# Patient Record
Sex: FEMALE | Race: WHITE | NOT HISPANIC OR LATINO
[De-identification: names, ages, dates, MRNs, and addresses within clinical notes are randomized per-mention and may not be internally consistent; named-entity substitution may affect disease eponyms.]

---

## 2017-01-20 ENCOUNTER — APPOINTMENT (OUTPATIENT)
Dept: MAMMOGRAPHY | Facility: IMAGING CENTER | Age: 67
End: 2017-01-20

## 2017-01-20 ENCOUNTER — OUTPATIENT (OUTPATIENT)
Dept: OUTPATIENT SERVICES | Facility: HOSPITAL | Age: 67
LOS: 1 days | End: 2017-01-20
Payer: MEDICARE

## 2017-01-20 ENCOUNTER — APPOINTMENT (OUTPATIENT)
Dept: RADIOLOGY | Facility: IMAGING CENTER | Age: 67
End: 2017-01-20

## 2017-01-20 DIAGNOSIS — M81.0 AGE-RELATED OSTEOPOROSIS WITHOUT CURRENT PATHOLOGICAL FRACTURE: ICD-10-CM

## 2017-01-20 DIAGNOSIS — Z12.31 ENCOUNTER FOR SCREENING MAMMOGRAM FOR MALIGNANT NEOPLASM OF BREAST: ICD-10-CM

## 2017-01-20 PROCEDURE — 77063 BREAST TOMOSYNTHESIS BI: CPT

## 2017-01-20 PROCEDURE — 77067 SCR MAMMO BI INCL CAD: CPT

## 2017-01-20 PROCEDURE — 77080 DXA BONE DENSITY AXIAL: CPT

## 2018-01-22 ENCOUNTER — APPOINTMENT (OUTPATIENT)
Dept: MAMMOGRAPHY | Facility: IMAGING CENTER | Age: 68
End: 2018-01-22
Payer: MEDICARE

## 2018-01-22 ENCOUNTER — OUTPATIENT (OUTPATIENT)
Dept: OUTPATIENT SERVICES | Facility: HOSPITAL | Age: 68
LOS: 1 days | End: 2018-01-22
Payer: MEDICARE

## 2018-01-22 DIAGNOSIS — Z00.8 ENCOUNTER FOR OTHER GENERAL EXAMINATION: ICD-10-CM

## 2018-01-22 PROCEDURE — 77067 SCR MAMMO BI INCL CAD: CPT

## 2018-01-22 PROCEDURE — 77063 BREAST TOMOSYNTHESIS BI: CPT

## 2018-01-22 PROCEDURE — 77067 SCR MAMMO BI INCL CAD: CPT | Mod: 26

## 2018-01-22 PROCEDURE — 77063 BREAST TOMOSYNTHESIS BI: CPT | Mod: 26

## 2018-08-04 ENCOUNTER — EMERGENCY (EMERGENCY)
Facility: HOSPITAL | Age: 68
LOS: 1 days | Discharge: HOME | End: 2018-08-04
Attending: EMERGENCY MEDICINE
Payer: MEDICARE

## 2018-08-04 VITALS
WEIGHT: 136.91 LBS | HEIGHT: 65 IN | RESPIRATION RATE: 15 BRPM | HEART RATE: 72 BPM | OXYGEN SATURATION: 99 % | SYSTOLIC BLOOD PRESSURE: 176 MMHG | DIASTOLIC BLOOD PRESSURE: 84 MMHG | TEMPERATURE: 98 F

## 2018-08-04 DIAGNOSIS — Z96.642 PRESENCE OF LEFT ARTIFICIAL HIP JOINT: Chronic | ICD-10-CM

## 2018-08-04 LAB
ALBUMIN SERPL ELPH-MCNC: 4.4 G/DL — SIGNIFICANT CHANGE UP (ref 3.3–5)
ALP SERPL-CCNC: 61 U/L — SIGNIFICANT CHANGE UP (ref 40–120)
ALT FLD-CCNC: 25 U/L — SIGNIFICANT CHANGE UP (ref 12–78)
ANION GAP SERPL CALC-SCNC: 10 MMOL/L — SIGNIFICANT CHANGE UP (ref 5–17)
APTT BLD: 29 SEC — SIGNIFICANT CHANGE UP (ref 27.5–37.4)
AST SERPL-CCNC: 26 U/L — SIGNIFICANT CHANGE UP (ref 15–37)
BASOPHILS # BLD AUTO: 0.01 K/UL — SIGNIFICANT CHANGE UP (ref 0–0.2)
BASOPHILS NFR BLD AUTO: 0.1 % — SIGNIFICANT CHANGE UP (ref 0–2)
BILIRUB SERPL-MCNC: 0.8 MG/DL — SIGNIFICANT CHANGE UP (ref 0.2–1.2)
BUN SERPL-MCNC: 20 MG/DL — SIGNIFICANT CHANGE UP (ref 7–23)
CALCIUM SERPL-MCNC: 9.2 MG/DL — SIGNIFICANT CHANGE UP (ref 8.5–10.1)
CHLORIDE SERPL-SCNC: 92 MMOL/L — LOW (ref 96–108)
CK MB BLD-MCNC: 1.9 % — SIGNIFICANT CHANGE UP (ref 0–3.5)
CK MB CFR SERPL CALC: 2.4 NG/ML — SIGNIFICANT CHANGE UP (ref 0–3.6)
CK SERPL-CCNC: 129 U/L — SIGNIFICANT CHANGE UP (ref 26–192)
CO2 SERPL-SCNC: 29 MMOL/L — SIGNIFICANT CHANGE UP (ref 22–31)
CREAT SERPL-MCNC: 0.91 MG/DL — SIGNIFICANT CHANGE UP (ref 0.5–1.3)
EOSINOPHIL # BLD AUTO: 0.05 K/UL — SIGNIFICANT CHANGE UP (ref 0–0.5)
EOSINOPHIL NFR BLD AUTO: 0.7 % — SIGNIFICANT CHANGE UP (ref 0–6)
GLUCOSE SERPL-MCNC: 91 MG/DL — SIGNIFICANT CHANGE UP (ref 70–99)
HCT VFR BLD CALC: 36.7 % — SIGNIFICANT CHANGE UP (ref 34.5–45)
HGB BLD-MCNC: 13 G/DL — SIGNIFICANT CHANGE UP (ref 11.5–15.5)
IMM GRANULOCYTES NFR BLD AUTO: 0.3 % — SIGNIFICANT CHANGE UP (ref 0–1.5)
INR BLD: 1.15 RATIO — SIGNIFICANT CHANGE UP (ref 0.88–1.16)
LYMPHOCYTES # BLD AUTO: 2.14 K/UL — SIGNIFICANT CHANGE UP (ref 1–3.3)
LYMPHOCYTES # BLD AUTO: 31.8 % — SIGNIFICANT CHANGE UP (ref 13–44)
MCHC RBC-ENTMCNC: 32.5 PG — SIGNIFICANT CHANGE UP (ref 27–34)
MCHC RBC-ENTMCNC: 35.4 GM/DL — SIGNIFICANT CHANGE UP (ref 32–36)
MCV RBC AUTO: 91.8 FL — SIGNIFICANT CHANGE UP (ref 80–100)
MONOCYTES # BLD AUTO: 0.72 K/UL — SIGNIFICANT CHANGE UP (ref 0–0.9)
MONOCYTES NFR BLD AUTO: 10.7 % — SIGNIFICANT CHANGE UP (ref 2–14)
NEUTROPHILS # BLD AUTO: 3.8 K/UL — SIGNIFICANT CHANGE UP (ref 1.8–7.4)
NEUTROPHILS NFR BLD AUTO: 56.4 % — SIGNIFICANT CHANGE UP (ref 43–77)
NRBC # BLD: 0 /100 WBCS — SIGNIFICANT CHANGE UP (ref 0–0)
PLATELET # BLD AUTO: 224 K/UL — SIGNIFICANT CHANGE UP (ref 150–400)
POTASSIUM SERPL-MCNC: 3.4 MMOL/L — LOW (ref 3.5–5.3)
POTASSIUM SERPL-SCNC: 3.4 MMOL/L — LOW (ref 3.5–5.3)
PROT SERPL-MCNC: 7.3 G/DL — SIGNIFICANT CHANGE UP (ref 6–8.3)
PROTHROM AB SERPL-ACNC: 12.6 SEC — SIGNIFICANT CHANGE UP (ref 9.8–12.7)
RBC # BLD: 4 M/UL — SIGNIFICANT CHANGE UP (ref 3.8–5.2)
RBC # FLD: 12.6 % — SIGNIFICANT CHANGE UP (ref 10.3–14.5)
SODIUM SERPL-SCNC: 131 MMOL/L — LOW (ref 135–145)
TROPONIN I SERPL-MCNC: <.015 NG/ML — SIGNIFICANT CHANGE UP (ref 0.01–0.04)
WBC # BLD: 6.74 K/UL — SIGNIFICANT CHANGE UP (ref 3.8–10.5)
WBC # FLD AUTO: 6.74 K/UL — SIGNIFICANT CHANGE UP (ref 3.8–10.5)

## 2018-08-04 PROCEDURE — 99285 EMERGENCY DEPT VISIT HI MDM: CPT

## 2018-08-04 PROCEDURE — 71045 X-RAY EXAM CHEST 1 VIEW: CPT | Mod: 26

## 2018-08-04 RX ORDER — LOSARTAN POTASSIUM 100 MG/1
25 TABLET, FILM COATED ORAL DAILY
Qty: 0 | Refills: 0 | Status: DISCONTINUED | OUTPATIENT
Start: 2018-08-04 | End: 2018-08-08

## 2018-08-04 RX ORDER — PANTOPRAZOLE SODIUM 20 MG/1
40 TABLET, DELAYED RELEASE ORAL ONCE
Qty: 0 | Refills: 0 | Status: COMPLETED | OUTPATIENT
Start: 2018-08-04 | End: 2018-08-04

## 2018-08-04 RX ORDER — MORPHINE SULFATE 50 MG/1
2 CAPSULE, EXTENDED RELEASE ORAL ONCE
Qty: 0 | Refills: 0 | Status: DISCONTINUED | OUTPATIENT
Start: 2018-08-04 | End: 2018-08-04

## 2018-08-04 RX ORDER — SODIUM CHLORIDE 9 MG/ML
3 INJECTION INTRAMUSCULAR; INTRAVENOUS; SUBCUTANEOUS ONCE
Qty: 0 | Refills: 0 | Status: COMPLETED | OUTPATIENT
Start: 2018-08-04 | End: 2018-08-04

## 2018-08-04 RX ORDER — KETOROLAC TROMETHAMINE 30 MG/ML
30 SYRINGE (ML) INJECTION ONCE
Qty: 0 | Refills: 0 | Status: DISCONTINUED | OUTPATIENT
Start: 2018-08-04 | End: 2018-08-04

## 2018-08-04 RX ORDER — ACETAMINOPHEN 500 MG
650 TABLET ORAL ONCE
Qty: 0 | Refills: 0 | Status: COMPLETED | OUTPATIENT
Start: 2018-08-04 | End: 2018-08-04

## 2018-08-04 RX ADMIN — Medication 650 MILLIGRAM(S): at 23:46

## 2018-08-04 RX ADMIN — Medication 650 MILLIGRAM(S): at 23:16

## 2018-08-04 RX ADMIN — SODIUM CHLORIDE 3 MILLILITER(S): 9 INJECTION INTRAMUSCULAR; INTRAVENOUS; SUBCUTANEOUS at 21:11

## 2018-08-04 NOTE — ED ADULT NURSE NOTE - NSIMPLEMENTINTERV_GEN_ALL_ED
Implemented All Universal Safety Interventions:  [place] to call system. Call bell, personal items and telephone within reach. Instruct patient to call for assistance. Room bathroom lighting operational. Non-slip footwear when patient is off stretcher. Physically safe environment: no spills, clutter or unnecessary equipment. Stretcher in lowest position, wheels locked, appropriate side rails in place.

## 2018-08-04 NOTE — ED ADULT NURSE REASSESSMENT NOTE - NS ED NURSE REASSESS COMMENT FT1
[id number] A&OX4, no shortness of breath, no diaphoresis, c/o slight chest discomfort, Tylenol 650 mg po administered as prescribed. Vital signs taken, Pt reports that /86 is on higher jesus because she runs 140 systolic, Dr. Park made aware. Pt connected to cardiopulmonary monitor, monitored.  Safety precautions observed.

## 2018-08-04 NOTE — ED PROVIDER NOTE - OBJECTIVE STATEMENT
pt c/o 2 hrs chest tightness radiating to neck, now resolved. no fevers chills, ha, d/n/v, cough, sob, abd pain, edema, calf pain, travel.  pmd - [facility] lenny

## 2018-08-04 NOTE — ED ADULT NURSE NOTE - CHIEF COMPLAINT QUOTE
midsternal chest tightness without radiation on and off x a few hours. history of reflex, taking medication without relief

## 2018-08-05 VITALS
SYSTOLIC BLOOD PRESSURE: 167 MMHG | DIASTOLIC BLOOD PRESSURE: 83 MMHG | OXYGEN SATURATION: 100 % | RESPIRATION RATE: 16 BRPM | HEART RATE: 62 BPM

## 2018-08-05 LAB
CK MB BLD-MCNC: 1.5 % — SIGNIFICANT CHANGE UP (ref 0–3.5)
CK MB BLD-MCNC: 1.8 % — SIGNIFICANT CHANGE UP (ref 0–3.5)
CK MB CFR SERPL CALC: 1.7 NG/ML — SIGNIFICANT CHANGE UP (ref 0–3.6)
CK MB CFR SERPL CALC: 1.9 NG/ML — SIGNIFICANT CHANGE UP (ref 0–3.6)
CK SERPL-CCNC: 107 U/L — SIGNIFICANT CHANGE UP (ref 26–192)
CK SERPL-CCNC: 113 U/L — SIGNIFICANT CHANGE UP (ref 26–192)
TROPONIN I SERPL-MCNC: <.015 NG/ML — SIGNIFICANT CHANGE UP (ref 0.01–0.04)
TROPONIN I SERPL-MCNC: <.015 NG/ML — SIGNIFICANT CHANGE UP (ref 0.01–0.04)

## 2018-08-05 PROCEDURE — 36415 COLL VENOUS BLD VENIPUNCTURE: CPT

## 2018-08-05 PROCEDURE — 85730 THROMBOPLASTIN TIME PARTIAL: CPT

## 2018-08-05 PROCEDURE — 96375 TX/PRO/DX INJ NEW DRUG ADDON: CPT

## 2018-08-05 PROCEDURE — 85610 PROTHROMBIN TIME: CPT

## 2018-08-05 PROCEDURE — 84484 ASSAY OF TROPONIN QUANT: CPT

## 2018-08-05 PROCEDURE — 82553 CREATINE MB FRACTION: CPT

## 2018-08-05 PROCEDURE — 96374 THER/PROPH/DIAG INJ IV PUSH: CPT

## 2018-08-05 PROCEDURE — 99284 EMERGENCY DEPT VISIT MOD MDM: CPT | Mod: 25

## 2018-08-05 PROCEDURE — 80053 COMPREHEN METABOLIC PANEL: CPT

## 2018-08-05 PROCEDURE — 99285 EMERGENCY DEPT VISIT HI MDM: CPT

## 2018-08-05 PROCEDURE — 93005 ELECTROCARDIOGRAM TRACING: CPT

## 2018-08-05 PROCEDURE — 71045 X-RAY EXAM CHEST 1 VIEW: CPT

## 2018-08-05 PROCEDURE — 82550 ASSAY OF CK (CPK): CPT

## 2018-08-05 PROCEDURE — 85027 COMPLETE CBC AUTOMATED: CPT

## 2018-08-05 RX ORDER — LOSARTAN POTASSIUM 100 MG/1
100 TABLET, FILM COATED ORAL ONCE
Qty: 0 | Refills: 0 | Status: COMPLETED | OUTPATIENT
Start: 2018-08-05 | End: 2018-08-05

## 2018-08-05 RX ORDER — ONDANSETRON 8 MG/1
4 TABLET, FILM COATED ORAL ONCE
Qty: 0 | Refills: 0 | Status: COMPLETED | OUTPATIENT
Start: 2018-08-05 | End: 2018-08-05

## 2018-08-05 RX ORDER — MORPHINE SULFATE 50 MG/1
2 CAPSULE, EXTENDED RELEASE ORAL ONCE
Qty: 0 | Refills: 0 | Status: DISCONTINUED | OUTPATIENT
Start: 2018-08-05 | End: 2018-08-05

## 2018-08-05 RX ADMIN — LOSARTAN POTASSIUM 25 MILLIGRAM(S): 100 TABLET, FILM COATED ORAL at 00:27

## 2018-08-05 RX ADMIN — Medication 30 MILLIGRAM(S): at 00:54

## 2018-08-05 RX ADMIN — ONDANSETRON 4 MILLIGRAM(S): 8 TABLET, FILM COATED ORAL at 04:35

## 2018-08-05 RX ADMIN — MORPHINE SULFATE 2 MILLIGRAM(S): 50 CAPSULE, EXTENDED RELEASE ORAL at 04:38

## 2018-08-05 RX ADMIN — MORPHINE SULFATE 2 MILLIGRAM(S): 50 CAPSULE, EXTENDED RELEASE ORAL at 05:08

## 2018-08-05 RX ADMIN — Medication 30 MILLIGRAM(S): at 00:24

## 2018-08-05 RX ADMIN — LOSARTAN POTASSIUM 100 MILLIGRAM(S): 100 TABLET, FILM COATED ORAL at 08:47

## 2018-08-05 RX ADMIN — PANTOPRAZOLE SODIUM 40 MILLIGRAM(S): 20 TABLET, DELAYED RELEASE ORAL at 00:28

## 2018-08-05 NOTE — ED ADULT NURSE REASSESSMENT NOTE - NS ED NURSE REASSESS COMMENT FT1
[id number] Pt A&OX4 c/o chest pain numerical value 7/10 described pain "like a ball & tightness" non radiating, Dr. Park notified.  Stat EKG done & seen by Vivian.  No shortness of breath or diaphoresis. Medicated with Zofran 4mg ivp & Morphine Sulphate 2mg ivp as prescribed. O2 2 liters via nasal cannula started & in progress. Monitored closely.

## 2018-08-05 NOTE — CONSULT NOTE ADULT - SUBJECTIVE AND OBJECTIVE BOX
[facility]         Olya Coyne, Carmen, Theresa, Evelina, Chris, Tessa        [phone] (office)    CHIEF COMPLAINT: Patient is a 68y old  Female who presents with a chief complaint of cp    HPI: 68f htn, gerd, no known structural heart disease.  Very active at baseline, and rows 10k meters in 64min.  Able to be this active without any reproducible cv sxs suggestive of angina, hf or arrhythmia.  Was on ppi for gerd in the past, dc for relationship to osteoporosis.    Recently started checking her bp and found it elevated, and so started on hctz by her pcp.  Has been taking it only 2 days.  Yesterday at ~6p began to experience mid sternal cp rad into neck.  For 2 h was essentially constant, and after that it would return but wax and wane.  ekgs and ce x 3 all negative.  consultation is now being obtained.      PAST MEDICAL & SURGICAL HISTORY:  Hypertension  GERD (gastroesophageal reflux disease)  Arthritis  History of left hip replacement: 2013      SOCIAL HISTORY: No active tobacco. former smoker, 1-2 wine daily    FAMILY HISTORY:  +fh of premature athero, mother with mi in 50s    Outpatient medications:  mylanta  zantac  hctz 25  toprol 50  los 100    MEDICATIONS  (STANDING):  losartan 25 milliGRAM(s) Oral daily    MEDICATIONS  (PRN):      Allergies    Arthrotec (Other)  NSAIDs (Other)  sulfa drugs (Unknown)  tetracycline (Unknown)    Intolerances        REVIEW OF SYSTEMS: Is negative for eye, ENT, GI, , allergic, dermatologic, musculoskeletal and neurologic, except as described above.    VITAL SIGNS:   Vital Signs Last 24 Hrs  T(C): 36.5 (05 Aug 2018 07:54), Max: 36.7 (05 Aug 2018 01:15)  T(F): 97.7 (05 Aug 2018 07:54), Max: 98 (05 Aug 2018 01:15)  HR: 62 (05 Aug 2018 08:47) (60 - 72)  BP: 167/83 (05 Aug 2018 08:47) (140/72 - 179/84)  BP(mean): --  RR: 16 (05 Aug 2018 08:47) (15 - 21)  SpO2: 100% (05 Aug 2018 08:47) (96% - 100%)    I&O's Summary      PHYSICAL EXAM:    Constitutional: NAD, awake and alert, well-developed  Eyes:  EOMI, no oral cyanosis, conjunctivae clear, anicteric.  Pulmonary: Non-labored, breath sounds are clear bilaterally, no wheezing, rales or rhonchi  Cardiovascular:  regular S1 and S2. No murmur.  No rubs, gallops or clicks  Gastrointestinal: Bowel Sounds present, soft, nontender.   Lymph: No peripheral edema.   Neurological: Alert, strength and sensitivity are grossly intact  Skin: No obvious lesions/rashes.   Psych:  Mood & affect appropriate .    LABS: All Labs Reviewed:                        13.0   6.74  )-----------( 224      ( 04 Aug 2018 20:45 )             36.7     04 Aug 2018 20:45    131    |  92     |  20     ----------------------------<  91     3.4     |  29     |  0.91     Ca    9.2        04 Aug 2018 20:45    TPro  7.3    /  Alb  4.4    /  TBili  0.8    /  DBili  x      /  AST  26     /  ALT  25     /  AlkPhos  61     04 Aug 2018 20:45    PT/INR - ( 04 Aug 2018 20:45 )   PT: 12.6 sec;   INR: 1.15 ratio         PTT - ( 04 Aug 2018 20:45 )  PTT:29.0 sec  CARDIAC MARKERS ( 05 Aug 2018 08:06 )  <.015 ng/mL / x     / 113 U/L / x     / 1.7 ng/mL  CARDIAC MARKERS ( 05 Aug 2018 02:23 )  <.015 ng/mL / x     / 107 U/L / x     / 1.9 ng/mL  CARDIAC MARKERS ( 04 Aug 2018 20:45 )  <.015 ng/mL / x     / 129 U/L / x     / 2.4 ng/mL      Blood Culture:         RADIOLOGY:    EKG: nsr

## 2018-08-05 NOTE — ED ADULT NURSE REASSESSMENT NOTE - NS ED NURSE REASSESS COMMENT FT1
[id number] Pt medicated with Toradol 30mg ivp numerical pain scale 7/10 to chest wall., Losaartan 25mg po &Protonix 40mg iv as prescribed. monitored.

## 2018-08-05 NOTE — CONSULT NOTE ADULT - ASSESSMENT
The patient's chest discomfort is of uncertain etiology. Overall, at this point, it seems relatively unlikely that the patient is suffering chest discomfort on the basis of an acute coronary syndrome.    Based on the patient's history, physical examination, EKG and laboratory testing, it seems as if the patient is at not a high risk individual. Based on the patient's clinical course, I would recommend   that they be discharged with the expectation that they would followup in our office for outpatient testing.  I have recommended outpatient echocardiography to make an assessment of cardiac structural integrity, as well as nuclear stress testing. The patient has been provided written instructions to obtain these in our office. The patient should continue aspirin, 81 mg daily, at least until the above-mentioned examinations have been completed.    If those examinations are unremarkable, alternative explanations for chest discomfort, including a gastrointestinal etiology and a musculoskeletal etiology, will need to be considered.  I favor a GI etiology and have suggested that she resume ppi, at least for the short term.  A followup evaluation for uncontrolled htn has been suggested in ~ 1month

## 2018-08-05 NOTE — ED ADULT NURSE REASSESSMENT NOTE - NS ED NURSE REASSESS COMMENT FT1
0615 A&OX4 denies pain at this time. stated first time felt so much better. Vital signs stable. Pending cardiology consult. (2nd enzyme neg)

## 2018-08-06 ENCOUNTER — OTHER (OUTPATIENT)
Age: 68
End: 2018-08-06

## 2018-08-06 DIAGNOSIS — R07.89 OTHER CHEST PAIN: ICD-10-CM

## 2018-08-06 PROBLEM — K21.9 GASTRO-ESOPHAGEAL REFLUX DISEASE WITHOUT ESOPHAGITIS: Chronic | Status: ACTIVE | Noted: 2018-08-04

## 2018-08-06 PROBLEM — I10 ESSENTIAL (PRIMARY) HYPERTENSION: Chronic | Status: ACTIVE | Noted: 2018-08-04

## 2018-08-06 PROBLEM — M19.90 UNSPECIFIED OSTEOARTHRITIS, UNSPECIFIED SITE: Chronic | Status: ACTIVE | Noted: 2018-08-04

## 2018-08-08 ENCOUNTER — APPOINTMENT (OUTPATIENT)
Dept: CARDIOLOGY | Facility: CLINIC | Age: 68
End: 2018-08-08
Payer: MEDICARE

## 2018-08-08 PROCEDURE — 78452 HT MUSCLE IMAGE SPECT MULT: CPT

## 2018-08-08 PROCEDURE — A9500: CPT

## 2018-08-08 PROCEDURE — 93015 CV STRESS TEST SUPVJ I&R: CPT

## 2018-08-13 ENCOUNTER — APPOINTMENT (OUTPATIENT)
Dept: CARDIOLOGY | Facility: CLINIC | Age: 68
End: 2018-08-13
Payer: MEDICARE

## 2018-08-13 PROCEDURE — 93306 TTE W/DOPPLER COMPLETE: CPT

## 2019-03-05 ENCOUNTER — APPOINTMENT (OUTPATIENT)
Dept: MAMMOGRAPHY | Facility: IMAGING CENTER | Age: 69
End: 2019-03-05
Payer: MEDICARE

## 2019-03-05 ENCOUNTER — OUTPATIENT (OUTPATIENT)
Dept: OUTPATIENT SERVICES | Facility: HOSPITAL | Age: 69
LOS: 1 days | End: 2019-03-05
Payer: MEDICARE

## 2019-03-05 DIAGNOSIS — Z96.642 PRESENCE OF LEFT ARTIFICIAL HIP JOINT: Chronic | ICD-10-CM

## 2019-03-05 DIAGNOSIS — Z00.8 ENCOUNTER FOR OTHER GENERAL EXAMINATION: ICD-10-CM

## 2019-03-05 PROCEDURE — 77067 SCR MAMMO BI INCL CAD: CPT | Mod: 26

## 2019-03-05 PROCEDURE — 77063 BREAST TOMOSYNTHESIS BI: CPT

## 2019-03-05 PROCEDURE — 77063 BREAST TOMOSYNTHESIS BI: CPT | Mod: 26

## 2019-03-05 PROCEDURE — 77067 SCR MAMMO BI INCL CAD: CPT

## 2020-03-06 ENCOUNTER — OUTPATIENT (OUTPATIENT)
Dept: OUTPATIENT SERVICES | Facility: HOSPITAL | Age: 70
LOS: 1 days | End: 2020-03-06
Payer: MEDICARE

## 2020-03-06 ENCOUNTER — APPOINTMENT (OUTPATIENT)
Dept: MAMMOGRAPHY | Facility: IMAGING CENTER | Age: 70
End: 2020-03-06
Payer: MEDICARE

## 2020-03-06 DIAGNOSIS — Z00.8 ENCOUNTER FOR OTHER GENERAL EXAMINATION: ICD-10-CM

## 2020-03-06 DIAGNOSIS — Z96.642 PRESENCE OF LEFT ARTIFICIAL HIP JOINT: Chronic | ICD-10-CM

## 2020-03-06 PROCEDURE — 77067 SCR MAMMO BI INCL CAD: CPT

## 2020-03-06 PROCEDURE — 77067 SCR MAMMO BI INCL CAD: CPT | Mod: 26

## 2020-03-06 PROCEDURE — 77063 BREAST TOMOSYNTHESIS BI: CPT

## 2020-03-06 PROCEDURE — 77063 BREAST TOMOSYNTHESIS BI: CPT | Mod: 26

## 2020-08-04 ENCOUNTER — OUTPATIENT (OUTPATIENT)
Dept: OUTPATIENT SERVICES | Facility: HOSPITAL | Age: 70
LOS: 1 days | End: 2020-08-04
Payer: MEDICARE

## 2020-08-04 ENCOUNTER — APPOINTMENT (OUTPATIENT)
Dept: RADIOLOGY | Facility: IMAGING CENTER | Age: 70
End: 2020-08-04
Payer: MEDICARE

## 2020-08-04 DIAGNOSIS — Z00.8 ENCOUNTER FOR OTHER GENERAL EXAMINATION: ICD-10-CM

## 2020-08-04 DIAGNOSIS — Z96.642 PRESENCE OF LEFT ARTIFICIAL HIP JOINT: Chronic | ICD-10-CM

## 2020-08-04 PROCEDURE — 77080 DXA BONE DENSITY AXIAL: CPT | Mod: 26

## 2020-08-04 PROCEDURE — 77080 DXA BONE DENSITY AXIAL: CPT

## 2021-03-09 ENCOUNTER — OUTPATIENT (OUTPATIENT)
Dept: OUTPATIENT SERVICES | Facility: HOSPITAL | Age: 71
LOS: 1 days | End: 2021-03-09
Payer: MEDICARE

## 2021-03-09 ENCOUNTER — APPOINTMENT (OUTPATIENT)
Dept: MAMMOGRAPHY | Facility: IMAGING CENTER | Age: 71
End: 2021-03-09
Payer: MEDICARE

## 2021-03-09 DIAGNOSIS — Z00.8 ENCOUNTER FOR OTHER GENERAL EXAMINATION: ICD-10-CM

## 2021-03-09 DIAGNOSIS — Z96.642 PRESENCE OF LEFT ARTIFICIAL HIP JOINT: Chronic | ICD-10-CM

## 2021-03-09 PROCEDURE — 77063 BREAST TOMOSYNTHESIS BI: CPT | Mod: 26

## 2021-03-09 PROCEDURE — 77063 BREAST TOMOSYNTHESIS BI: CPT

## 2021-03-09 PROCEDURE — 77067 SCR MAMMO BI INCL CAD: CPT

## 2021-03-09 PROCEDURE — 77067 SCR MAMMO BI INCL CAD: CPT | Mod: 26

## 2021-12-18 ENCOUNTER — EMERGENCY (EMERGENCY)
Facility: HOSPITAL | Age: 71
LOS: 1 days | Discharge: HOME | End: 2021-12-18
Attending: STUDENT IN AN ORGANIZED HEALTH CARE EDUCATION/TRAINING PROGRAM | Admitting: EMERGENCY MEDICINE
Payer: MEDICARE

## 2021-12-18 VITALS
HEIGHT: 65 IN | RESPIRATION RATE: 18 BRPM | TEMPERATURE: 98 F | DIASTOLIC BLOOD PRESSURE: 96 MMHG | SYSTOLIC BLOOD PRESSURE: 173 MMHG | OXYGEN SATURATION: 98 % | HEART RATE: 115 BPM | WEIGHT: 139.99 LBS

## 2021-12-18 VITALS
SYSTOLIC BLOOD PRESSURE: 157 MMHG | RESPIRATION RATE: 18 BRPM | DIASTOLIC BLOOD PRESSURE: 72 MMHG | TEMPERATURE: 98 F | HEART RATE: 92 BPM | OXYGEN SATURATION: 98 %

## 2021-12-18 DIAGNOSIS — Z96.642 PRESENCE OF LEFT ARTIFICIAL HIP JOINT: Chronic | ICD-10-CM

## 2021-12-18 LAB
ALBUMIN SERPL ELPH-MCNC: 3.5 G/DL — SIGNIFICANT CHANGE UP (ref 3.3–5)
ALP SERPL-CCNC: 78 U/L — SIGNIFICANT CHANGE UP (ref 40–120)
ALT FLD-CCNC: 25 U/L — SIGNIFICANT CHANGE UP (ref 12–78)
ANION GAP SERPL CALC-SCNC: 7 MMOL/L — SIGNIFICANT CHANGE UP (ref 5–17)
AST SERPL-CCNC: 27 U/L — SIGNIFICANT CHANGE UP (ref 15–37)
BASOPHILS # BLD AUTO: 0.02 K/UL — SIGNIFICANT CHANGE UP (ref 0–0.2)
BASOPHILS NFR BLD AUTO: 0.3 % — SIGNIFICANT CHANGE UP (ref 0–2)
BILIRUB SERPL-MCNC: 0.3 MG/DL — SIGNIFICANT CHANGE UP (ref 0.2–1.2)
BUN SERPL-MCNC: 9 MG/DL — SIGNIFICANT CHANGE UP (ref 7–23)
CALCIUM SERPL-MCNC: 8.6 MG/DL — SIGNIFICANT CHANGE UP (ref 8.5–10.1)
CHLORIDE SERPL-SCNC: 102 MMOL/L — SIGNIFICANT CHANGE UP (ref 96–108)
CK MB BLD-MCNC: 1.2 % — SIGNIFICANT CHANGE UP (ref 0–3.5)
CK MB CFR SERPL CALC: 1.6 NG/ML — SIGNIFICANT CHANGE UP (ref 0–3.6)
CK SERPL-CCNC: 137 U/L — SIGNIFICANT CHANGE UP (ref 26–192)
CO2 SERPL-SCNC: 27 MMOL/L — SIGNIFICANT CHANGE UP (ref 22–31)
CREAT SERPL-MCNC: 0.72 MG/DL — SIGNIFICANT CHANGE UP (ref 0.5–1.3)
D DIMER BLD IA.RAPID-MCNC: 261 NG/ML DDU — HIGH
EOSINOPHIL # BLD AUTO: 0.05 K/UL — SIGNIFICANT CHANGE UP (ref 0–0.5)
EOSINOPHIL NFR BLD AUTO: 0.8 % — SIGNIFICANT CHANGE UP (ref 0–6)
GLUCOSE SERPL-MCNC: 100 MG/DL — HIGH (ref 70–99)
HCT VFR BLD CALC: 35.6 % — SIGNIFICANT CHANGE UP (ref 34.5–45)
HGB BLD-MCNC: 12.5 G/DL — SIGNIFICANT CHANGE UP (ref 11.5–15.5)
IMM GRANULOCYTES NFR BLD AUTO: 0.3 % — SIGNIFICANT CHANGE UP (ref 0–1.5)
LYMPHOCYTES # BLD AUTO: 1.68 K/UL — SIGNIFICANT CHANGE UP (ref 1–3.3)
LYMPHOCYTES # BLD AUTO: 26 % — SIGNIFICANT CHANGE UP (ref 13–44)
MCHC RBC-ENTMCNC: 33.5 PG — SIGNIFICANT CHANGE UP (ref 27–34)
MCHC RBC-ENTMCNC: 35.1 GM/DL — SIGNIFICANT CHANGE UP (ref 32–36)
MCV RBC AUTO: 95.4 FL — SIGNIFICANT CHANGE UP (ref 80–100)
MONOCYTES # BLD AUTO: 0.86 K/UL — SIGNIFICANT CHANGE UP (ref 0–0.9)
MONOCYTES NFR BLD AUTO: 13.3 % — SIGNIFICANT CHANGE UP (ref 2–14)
NEUTROPHILS # BLD AUTO: 3.82 K/UL — SIGNIFICANT CHANGE UP (ref 1.8–7.4)
NEUTROPHILS NFR BLD AUTO: 59.3 % — SIGNIFICANT CHANGE UP (ref 43–77)
NRBC # BLD: 0 /100 WBCS — SIGNIFICANT CHANGE UP (ref 0–0)
PLATELET # BLD AUTO: 233 K/UL — SIGNIFICANT CHANGE UP (ref 150–400)
POTASSIUM SERPL-MCNC: 3.9 MMOL/L — SIGNIFICANT CHANGE UP (ref 3.5–5.3)
POTASSIUM SERPL-SCNC: 3.9 MMOL/L — SIGNIFICANT CHANGE UP (ref 3.5–5.3)
PROT SERPL-MCNC: 6.8 G/DL — SIGNIFICANT CHANGE UP (ref 6–8.3)
RBC # BLD: 3.73 M/UL — LOW (ref 3.8–5.2)
RBC # FLD: 13 % — SIGNIFICANT CHANGE UP (ref 10.3–14.5)
SARS-COV-2 RNA SPEC QL NAA+PROBE: SIGNIFICANT CHANGE UP
SODIUM SERPL-SCNC: 136 MMOL/L — SIGNIFICANT CHANGE UP (ref 135–145)
TROPONIN I, HIGH SENSITIVITY RESULT: 9.3 NG/L — SIGNIFICANT CHANGE UP
WBC # BLD: 6.45 K/UL — SIGNIFICANT CHANGE UP (ref 3.8–10.5)
WBC # FLD AUTO: 6.45 K/UL — SIGNIFICANT CHANGE UP (ref 3.8–10.5)

## 2021-12-18 PROCEDURE — 71045 X-RAY EXAM CHEST 1 VIEW: CPT

## 2021-12-18 PROCEDURE — 93010 ELECTROCARDIOGRAM REPORT: CPT

## 2021-12-18 PROCEDURE — 36415 COLL VENOUS BLD VENIPUNCTURE: CPT

## 2021-12-18 PROCEDURE — 99284 EMERGENCY DEPT VISIT MOD MDM: CPT | Mod: 25

## 2021-12-18 PROCEDURE — 71275 CT ANGIOGRAPHY CHEST: CPT | Mod: 26,MA

## 2021-12-18 PROCEDURE — 85379 FIBRIN DEGRADATION QUANT: CPT

## 2021-12-18 PROCEDURE — 82550 ASSAY OF CK (CPK): CPT

## 2021-12-18 PROCEDURE — 84484 ASSAY OF TROPONIN QUANT: CPT

## 2021-12-18 PROCEDURE — U0003: CPT

## 2021-12-18 PROCEDURE — 99285 EMERGENCY DEPT VISIT HI MDM: CPT

## 2021-12-18 PROCEDURE — 96360 HYDRATION IV INFUSION INIT: CPT | Mod: XU

## 2021-12-18 PROCEDURE — 82553 CREATINE MB FRACTION: CPT

## 2021-12-18 PROCEDURE — 71045 X-RAY EXAM CHEST 1 VIEW: CPT | Mod: 26

## 2021-12-18 PROCEDURE — 71275 CT ANGIOGRAPHY CHEST: CPT | Mod: MA

## 2021-12-18 PROCEDURE — 99284 EMERGENCY DEPT VISIT MOD MDM: CPT

## 2021-12-18 PROCEDURE — 85025 COMPLETE CBC W/AUTO DIFF WBC: CPT

## 2021-12-18 PROCEDURE — 80053 COMPREHEN METABOLIC PANEL: CPT

## 2021-12-18 PROCEDURE — U0005: CPT

## 2021-12-18 PROCEDURE — 93005 ELECTROCARDIOGRAM TRACING: CPT

## 2021-12-18 RX ORDER — METOPROLOL TARTRATE 50 MG
1 TABLET ORAL
Qty: 0 | Refills: 0 | DISCHARGE

## 2021-12-18 RX ORDER — ASPIRIN/CALCIUM CARB/MAGNESIUM 324 MG
1 TABLET ORAL
Qty: 0 | Refills: 0 | DISCHARGE

## 2021-12-18 RX ORDER — LOSARTAN POTASSIUM 100 MG/1
1 TABLET, FILM COATED ORAL
Qty: 0 | Refills: 0 | DISCHARGE

## 2021-12-18 RX ORDER — RANITIDINE HYDROCHLORIDE 150 MG/1
1 TABLET, FILM COATED ORAL
Qty: 0 | Refills: 0 | DISCHARGE

## 2021-12-18 RX ORDER — SODIUM CHLORIDE 9 MG/ML
1000 INJECTION INTRAMUSCULAR; INTRAVENOUS; SUBCUTANEOUS ONCE
Refills: 0 | Status: COMPLETED | OUTPATIENT
Start: 2021-12-18 | End: 2021-12-18

## 2021-12-18 RX ADMIN — SODIUM CHLORIDE 1000 MILLILITER(S): 9 INJECTION INTRAMUSCULAR; INTRAVENOUS; SUBCUTANEOUS at 07:37

## 2021-12-18 RX ADMIN — SODIUM CHLORIDE 1000 MILLILITER(S): 9 INJECTION INTRAMUSCULAR; INTRAVENOUS; SUBCUTANEOUS at 08:37

## 2021-12-18 NOTE — ED PROVIDER NOTE - PROGRESS NOTE DETAILS
pt seen by dr stephens cardiology and if cta neg pt to be discharged on increased Toprol  she usually takes 50 qd to increase dosing to 100 qd and follow up in office

## 2021-12-18 NOTE — ED ADULT NURSE REASSESSMENT NOTE - NS ED NURSE REASSESS COMMENT FT1
Received pt alert and orientated. Pt denies SOB abd pain and chest pain. Pt was sleeping last night and woke up with heart racing was 115 when got here now 100 beats per minute pt states she is feeling better. Pt is able to walk and move all extremities. Pt walked to the bathroom. Call bell within reach. Freq rounding performed. Safety/Comfort maintained. Will continue to monitor. Pt is awaiting a consult.

## 2021-12-18 NOTE — ED PROVIDER NOTE - OBJECTIVE STATEMENT
71 year old female with a history of HTN presents with tachycardia.  Patient states she woke up from sleep suddenly and felt palpitations/heart racing.  She noted her pulse to be 120-125.  Denies chest pain, SOB, diaphoresis, n/v/d.  States she never had tachycardia in the past.  Denies increase in caffein intake, stress, or bad dream.  Currently she feels "jumpy."  She does not have a cardiologist.  PMD Dr. Leon 71 year old female with a history of HTN presents with tachycardia.  Patient states she woke up from sleep suddenly and felt palpitations/heart racing.  She noted her pulse to be 120-125.  Denies chest pain, SOB, diaphoresis, n/v/d.  States she never had tachycardia in the past.  Denies increase in caffein intake, stress, or bad dream.  Currently she feels "jumpy."  She does not have a cardiologist.  No recent travel, surgery, immobilization, exogenous estrogen use.  No recent illness. PMD Dr. Leon

## 2021-12-18 NOTE — ED PROVIDER NOTE - CLINICAL SUMMARY MEDICAL DECISION MAKING FREE TEXT BOX
71 year old female with a history of HTN presents with heart racing/palpitations that woke her up from sleep 1 hour PTA.  Denies associated c/p, SOB, diaphoresis, syncope,  Currently feels better.  Labs, d-dimer, CE, EKG, CXR, consult cardiology,  R/o PE

## 2021-12-18 NOTE — CONSULT NOTE ADULT - SUBJECTIVE AND OBJECTIVE BOX
CHIEF COMPLAINT: Patient is a 71y old  Female who presents with a chief complaint of palpitations    HPI:  72 y/o F pMH HTN, osteoporosis, hip replacement, GERD who presented to ED after being awoken from sleep w/ "Pounding heart rate" w/ no other symptoms. No complaints of chest pain, dyspnea,  decreased exercise tolerance, N/V, HA reported. No signs of orthopnea or PND.  In ambulance HR was reported to be 120s.      PAST MEDICAL & SURGICAL HISTORY:  Arthritis    GERD (gastroesophageal reflux disease)    Hypertension    History of left hip replacement  2013        SOCIAL HISTORY:  No tobacco, ethanol, or drug abuse.    FAMILY HISTORY:    No family history of acute MI or sudden cardiac death.    MEDICATIONS  (STANDING):    MEDICATIONS  (PRN):      Allergies    Arthrotec (Other)  NSAIDs (Other)  sulfa drugs (Unknown)  tetracycline (Unknown)    Intolerances        REVIEW OF SYSTEMS:    CONSTITUTIONAL: No weakness, fevers or chills  EYES/ENT: No visual changes;  No vertigo or throat pain   NECK: No pain or stiffness  RESPIRATORY: No cough, wheezing, hemoptysis; No shortness of breath  CARDIOVASCULAR: No chest pain or palpitations  GASTROINTESTINAL: No abdominal pain. No nausea, vomiting, or hematemesis; No diarrhea or constipation. No melena or hematochezia.  GENITOURINARY: No dysuria, frequency or hematuria  NEUROLOGICAL: No numbness or weakness  SKIN: No itching or rash  All other review of systems is negative unless indicated above    VITAL SIGNS:   Vital Signs Last 24 Hrs  T(C): 36.8 (18 Dec 2021 07:58), Max: 36.8 (18 Dec 2021 05:26)  T(F): 98.2 (18 Dec 2021 07:58), Max: 98.2 (18 Dec 2021 05:26)  HR: 100 (18 Dec 2021 07:58) (100 - 115)  BP: 143/61 (18 Dec 2021 07:58) (143/61 - 173/96)  BP(mean): --  RR: 18 (18 Dec 2021 07:58) (18 - 18)  SpO2: 100% (18 Dec 2021 07:58) (98% - 100%)    I&O's Summary      On Exam:  Tele: ST  Constitutional: NAD, alert and oriented x 3  Lungs:  Non-labored, breath sounds are clear bilaterally, No wheezing, rales or rhonchi  Cardiovascular: RRR.  S1 and S2 positive.  No murmurs, rubs, gallops or clicks  Gastrointestinal: Bowel Sounds present, soft, nontender.   Lymph: No peripheral edema. No cervical lymphadenopathy.  Neurological: Alert, no focal deficits  Skin: No rashes or ulcers   Psych:  Mood & affect appropriate.    LABS: All Labs Reviewed:                        12.5   6.45  )-----------( 233      ( 18 Dec 2021 06:32 )             35.6     18 Dec 2021 06:32    136    |  102    |  9      ----------------------------<  100    3.9     |  27     |  0.72     Ca    8.6        18 Dec 2021 06:32    TPro  6.8    /  Alb  3.5    /  TBili  0.3    /  DBili  x      /  AST  27     /  ALT  25     /  AlkPhos  78     18 Dec 2021 06:32      CARDIAC MARKERS ( 18 Dec 2021 06:32 )  x     / x     / 137 U/L / x     / 1.6 ng/mL      Blood Culture:         RADIOLOGY:  < from: CT Angio Chest PE Protocol w/ IV Cont (12.18.21 @ 09:41) >  ACC: [id number] EXAM:  CT ANGIO CHEST PULM [facility]                          PROCEDURE DATE:  12/18/2021          INTERPRETATION:  CLINICAL INFORMATION: Palpitations, tachycardia.    COMPARISON: None.    CONTRAST/COMPLICATIONS:  IV Contrast: Omnipaque 350  59 cc administered   41 cc discarded  Oral Contrast: NONE  Complications: None reported at time of study completion    PROCEDURE:  CT Angiography of the Chest.  Sagittal and coronal reformats were performed as well as 3D (MIP)   reconstructions.    FINDINGS:    LUNGS AND AIRWAYS: Patent central airways.  Lungs are clear.  PLEURA: No pleural effusion. No pneumothorax or pneumomediastinum.  MEDIASTINUM AND WESLEY: No lymphadenopathy.  VESSELS: There is no evidence of filling defect in the first, second, or   third order branches of the pulmonary arteries. The pulmonary trunk and   main pulmonary arteries are unremarkable. The thoracic aorta and great   vessels are unremarkable.  HEART: Heart size is normal. No pericardial effusion.  CHEST WALL AND LOWER NECK: Within normal limits.  VISUALIZED UPPER ABDOMEN: Within normal limits.  BONES: Within normal limits.    IMPRESSION: No evidence of pulmonary embolism.    --- End of Report ---            ALEXANDRO CERVANTES MD; Attending Radiologist  Thisdocument has been electronically signed. Dec 18 2021 10:10AM    < end of copied text >    EKG:  ST

## 2021-12-18 NOTE — CONSULT NOTE ADULT - ASSESSMENT
70 y/o F pMH HTN, osteoporosis, hip replacement, GERD who presented to ED after being awoken from sleep w/ "Pounding heart rate" w/ no other symptoms. No complaints of chest pain, dyspnea,  decreased exercise tolerance, N/V, HA reported. No signs of orthopnea or PND.  In ambulance HR was reported to be 120s.        Pt has been asymptomatic   -there is no evidence of acute ischemia.  -her ecg is w/o ischemic changes and unchanged when compared to prior ECG   -Trop negative x1 would trend one more 4 hours from first   -there is no evidence of significant arrhythmia.  -Ddimer noted  -CT negative for PE  -would recommend increasing toprol xl to 100 daily  -there is no evidence for meaningful  volume overload.  -From a cardiac standpoint if second trop negative  the patient may be discharged home  - To follow up w/ cardiology in one week   -Pt is hemodynamically stable    Thank you for the consult  Will continue to follow  Boby Scanlon DNP  Cardiology   Spectra #[phone]

## 2021-12-18 NOTE — ED PROVIDER NOTE - CARE PROVIDER_API CALL
Boby Morales)  Cardiovascular Disease  [address] [place] [address]  Phone: [phone]  Fax: [phone]  Follow Up Time:

## 2021-12-18 NOTE — ED PROVIDER NOTE - NSFOLLOWUPINSTRUCTIONS_ED_ALL_ED_FT
INCREASE YOUR DAILY TOPROL FROM 50 MG  MG DAILY  FOLLOW UP WITH DR ARITA    Heart Palpitations    WHAT YOU NEED TO KNOW:    Heart palpitations are feelings that your heart races, jumps, throbs, or flutters. You may feel extra beats, no beats for a short time, or skipped beats. You may have these feelings in your chest, throat, or neck. They may happen when you are sitting, standing, or lying. Heart palpitations may be frightening, but are usually not caused by a serious problem.     DISCHARGE INSTRUCTIONS:    Call 911 or have someone else call for any of the following:   •You have any of the following signs of a heart attack: ?Squeezing, pressure, or pain in your chest      ?You may also have any of the following: ?Discomfort or pain in your back, neck, jaw, stomach, or arm      ?Shortness of breath      ?Nausea or vomiting      ?Lightheadedness or a sudden cold sweat        •You have any of the following signs of a stroke: ?Numbness or drooping on one side of your face       ?Weakness in an arm or leg      ?Confusion or difficulty speaking      ?Dizziness, a severe headache, or vision loss      •You faint or lose consciousness.       Return to the emergency department if:   •Your palpitations happen more often or get more intense.           Contact your healthcare provider if:   •You have new or worsening swelling in your feet or ankles.      •You have questions or concerns about your condition or care.      Follow up with your healthcare provider as directed: You may need to follow up with a cardiologist. You may need tests to check for heart problems that cause palpitations. Write down your questions so you remember to ask them during your visits.     Keep a record: Write down when your palpitations start and stop, what you were doing when they started, and your symptoms. Keep track of what you ate or drank within a few hours of your palpitations. Include anything that seemed to help your symptoms, such as lying down or holding your breath. This record will help you and your healthcare provider learn what triggers your palpitations. Bring this record with you to your follow up visits.    Help prevent heart palpitations:   •Manage stress and anxiety. Find ways to relax such as listening to music, meditating, or doing yoga. Exercise can also help decrease stress and anxiety. Talk to someone you trust about your stress or anxiety. You can also talk to a therapist.       •Get plenty of sleep every night. Ask your healthcare provider how much sleep you need each night.       •Do not drink caffeine or alcohol. Caffeine and alcohol can make your palpitations worse. Caffeine is found in soda, coffee, tea, chocolate, and drinks that increase your energy.       •Do not smoke. Nicotine and other chemicals in cigarettes and cigars may damage your heart and blood vessels. Ask your healthcare provider for information if you currently smoke and need help to quit. E-cigarettes or smokeless tobacco still contain nicotine. Talk to your healthcare provider before you use these products.       •Do not use illegal drugs. Talk to your healthcare provider if you use illegal drugs and want help to quit.

## 2021-12-18 NOTE — CONSULT NOTE ADULT - ATTENDING COMMENTS
Chart reviewed    Patient seen and examined    Agree with plan as outlined above    70 y/o F pMH HTN, osteoporosis, hip replacement, GERD who presented to ED after being awoken from sleep w/ "Pounding heart rate" w/ no other symptoms. No complaints of chest pain, dyspnea,  decreased exercise tolerance, N/V, HA reported. No signs of orthopnea or PND.  In ambulance HR was reported to be 120s.        Pt has been asymptomatic   -there is no evidence of acute ischemia.  -her ecg is w/o ischemic changes and unchanged when compared to prior ECG   -Trop negative x1 would trend one more 4 hours from first   -there is no evidence of significant arrhythmia.  -Ddimer noted  -CT negative for PE  -would recommend increasing toprol xl to 100 daily  -there is no evidence for meaningful  volume overload.  -From a cardiac standpoint if second trop negative  the patient may be discharged home  - To follow up w/ cardiology in one week   -Pt is hemodynamically stable

## 2021-12-18 NOTE — ED PROVIDER NOTE - PATIENT PORTAL LINK FT
You can access the FollowMyHealth Patient Portal offered by [facility] by registering at the following website: [email or web address]. By joining [employer]’s FollowMyHealth portal, you will also be able to view your health information using other applications (apps) compatible with our system.

## 2021-12-20 ENCOUNTER — APPOINTMENT (OUTPATIENT)
Dept: CARDIOLOGY | Facility: CLINIC | Age: 71
End: 2021-12-20
Payer: MEDICARE

## 2021-12-20 ENCOUNTER — NON-APPOINTMENT (OUTPATIENT)
Age: 71
End: 2021-12-20

## 2021-12-20 VITALS
BODY MASS INDEX: 24.41 KG/M2 | HEIGHT: 64 IN | OXYGEN SATURATION: 99 % | SYSTOLIC BLOOD PRESSURE: 171 MMHG | WEIGHT: 143 LBS | HEART RATE: 63 BPM | DIASTOLIC BLOOD PRESSURE: 95 MMHG

## 2021-12-20 DIAGNOSIS — R00.2 PALPITATIONS: ICD-10-CM

## 2021-12-20 DIAGNOSIS — Z87.891 PERSONAL HISTORY OF NICOTINE DEPENDENCE: ICD-10-CM

## 2021-12-20 DIAGNOSIS — Z78.9 OTHER SPECIFIED HEALTH STATUS: ICD-10-CM

## 2021-12-20 PROCEDURE — 99215 OFFICE O/P EST HI 40 MIN: CPT

## 2021-12-20 PROCEDURE — 93000 ELECTROCARDIOGRAM COMPLETE: CPT

## 2021-12-20 RX ORDER — CHROMIUM 200 MCG
TABLET ORAL
Refills: 0 | Status: ACTIVE | COMMUNITY

## 2021-12-20 RX ORDER — PNV NO.95/FERROUS FUM/FOLIC AC 28MG-0.8MG
500-200 TABLET ORAL
Refills: 0 | Status: ACTIVE | COMMUNITY

## 2021-12-20 RX ORDER — ASPIRIN ENTERIC COATED TABLETS 81 MG 81 MG/1
81 TABLET, DELAYED RELEASE ORAL
Refills: 0 | Status: ACTIVE | COMMUNITY

## 2021-12-20 RX ORDER — FAMOTIDINE 20 MG/1
20 TABLET, FILM COATED ORAL
Refills: 0 | Status: ACTIVE | COMMUNITY

## 2021-12-20 RX ORDER — LOSARTAN POTASSIUM 100 MG/1
100 TABLET, FILM COATED ORAL
Refills: 0 | Status: ACTIVE | COMMUNITY

## 2021-12-20 NOTE — HISTORY OF PRESENT ILLNESS
[FreeTextEntry1] : Anali presented to the office today for cardiovascular evaluation.  I take care of her .\par \par She has now 71 years old, with a history of hypertension.  She is not known to have any underlying structural heart disease.\par \par At baseline, she is active.  She rows 10,000 m on a regular basis.  With these and other physical activities, she feels well, without reproducible chest discomfort or shortness of breath suggestive of angina.  She denies orthopnea, PND and edema.  She denies palpitations, dizziness and syncope.\par \par Recently, she has noted some irregularities in her heart rhythm, and has also noticed that she is anxious.  Those 2 sensations go together.  It is not entirely clear whether the anxiety provokes the sense of tachycardia, or vice versa.  On Friday night she was awakened with a sense of tachycardia, and found her heart rate to be 120 bpm.  She says that her heart rate was elevated and sustained elevated the entire time she was in the emergency department.  Her EKG in the emergency department was 97 bpm, a sinus rhythm.  She was told to double her metoprolol succinate up to 100 mg daily, and follow-up here.\par \par She does not feel palpitations now, though she does admit to having felt some irregularities in her heart rhythm over the past 24 hours.  She says that her blood pressure is typically very well controlled.  \par \par \par Rows 10k\par \par no sxs\chica woke up at [facility] with palpitations, hr 120. called 911. ems came no meds given, monitor reveale?sr

## 2021-12-20 NOTE — DISCUSSION/SUMMARY
[FreeTextEntry1] : It is not clear what caused her heart rate of 120, but I suspect that this was an anxiety mediated sinus tachycardia.  She is not clear why she has been getting anxious, as it seems to happen out of the blue, and things have been going well overall.  We will try to get to the bottom of that overall.\par \par I have suggested precautionary testing to include an echocardiogram and a Holter monitor.  She will start checking her blood pressure twice daily at home, noting that her blood pressure is quite elevated today, and does not improve by the conclusion of the examination.  I am hoping that this reflects reactive hypertension.  I will be in contact with her to discuss the results of her examinations, as well as my review of her blood pressures.  I suspect that we will ultimately be able to discontinue her aspirin, but I will leave that discussion for another time.

## 2021-12-20 NOTE — REVIEW OF SYSTEMS
Request for refill of Lisinopril-hydrochlorothiazide 20/25 - patient has an upcoming office visit on 4/13/2021.    Filled #15 sent to [facility].   
[Negative] : Heme/Lymph

## 2022-01-03 ENCOUNTER — APPOINTMENT (OUTPATIENT)
Dept: CARDIOLOGY | Facility: CLINIC | Age: 72
End: 2022-01-03
Payer: MEDICARE

## 2022-01-03 PROCEDURE — 93224 XTRNL ECG REC UP TO 48 HRS: CPT

## 2022-01-03 PROCEDURE — 93306 TTE W/DOPPLER COMPLETE: CPT

## 2022-01-11 ENCOUNTER — NON-APPOINTMENT (OUTPATIENT)
Age: 72
End: 2022-01-11

## 2022-01-11 ENCOUNTER — APPOINTMENT (OUTPATIENT)
Dept: CARDIOLOGY | Facility: CLINIC | Age: 72
End: 2022-01-11
Payer: MEDICARE

## 2022-01-11 VITALS
WEIGHT: 146 LBS | SYSTOLIC BLOOD PRESSURE: 177 MMHG | HEIGHT: 64 IN | BODY MASS INDEX: 24.92 KG/M2 | OXYGEN SATURATION: 98 % | DIASTOLIC BLOOD PRESSURE: 91 MMHG | HEART RATE: 63 BPM

## 2022-01-11 PROCEDURE — 93000 ELECTROCARDIOGRAM COMPLETE: CPT

## 2022-01-11 PROCEDURE — 99214 OFFICE O/P EST MOD 30 MIN: CPT

## 2022-01-11 NOTE — DISCUSSION/SUMMARY
[FreeTextEntry1] : It is not clear what caused her heart rate of 120, but I suspect that this was an anxiety mediated sinus tachycardia.  She is not clear why she has been getting anxious, as it seems to happen out of the blue, and things have been going well overall.   Her heart appears structurally normal on echo.  Her Holter is reassuring.\par \par She very clearly has whitecoat hypertension.  I don't think she needs additional medication.\par \par She can discuss the possibility of anxiety medication, but I certainly don't think it is necessary on my end.\par \par We discussed that she can follow-up in about 6 months.

## 2022-01-11 NOTE — HISTORY OF PRESENT ILLNESS
[FreeTextEntry1] : Anali presented to the office today for cardiovascular evaluation.  I saw her a few weeks ago.\par \par She is now 71 years old, with a history of hypertension.  She is not known to have any underlying structural heart disease.\par \par At baseline, she is active.  She rows 10,000 m on a regular basis.  With these and other physical activities, she feels well, without reproducible chest discomfort or shortness of breath suggestive of angina.  She denies orthopnea, PND and edema.  She denies palpitations, dizziness and syncope.\par \par Recently, she has noted some irregularities in her heart rhythm, and also noticed that she is anxious.  She described an episode where she was awakened with a sense of tachycardia, and found her heart rate to be 120 bpm.  She says that her heart rate was elevated and sustained elevated the entire time she was in the emergency department.  Her EKG in the emergency department was 97 bpm, a sinus rhythm.  She was told to double her metoprolol succinate up to 100 mg daily, and follow-up here.\par \par I found her blood pressure to be quite elevated.  I recommended an echocardiogram and a Holter monitor.  I asked that she check her blood pressure at home.\par \par Her blood pressure at home has been reproducibly normal.  An echocardiogram was normal.  A Holter monitor revealed a sinus rhythm with a single brief episode of an atrial rhythm with a heart rate in the 80s.\par \par

## 2022-04-04 ENCOUNTER — APPOINTMENT (OUTPATIENT)
Dept: ORTHOPEDIC SURGERY | Facility: CLINIC | Age: 72
End: 2022-04-04
Payer: MEDICARE

## 2022-04-04 VITALS — HEIGHT: 64 IN | WEIGHT: 142 LBS | BODY MASS INDEX: 24.24 KG/M2

## 2022-04-04 PROCEDURE — 99203 OFFICE O/P NEW LOW 30 MIN: CPT

## 2022-04-04 PROCEDURE — 73564 X-RAY EXAM KNEE 4 OR MORE: CPT | Mod: RT
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
## 2022-04-04 NOTE — DISCUSSION/SUMMARY
[de-identified] : Impression internal derangement right knee, remote foreign body\par Plan MRI right knee

## 2022-04-04 NOTE — HISTORY OF PRESENT ILLNESS
[de-identified] : 71-year-old female who 6 weeks ago while playing pickle ball pivoted began to experience intermittent pain anterior aspect right knee.  Pain experienced when walking at times when lying down.  Denies swelling locking or giving out.  Is ambulating independently.  Is status post right anterior total hip replacement 9 years ago with no complaints.  Denies hip or back pain

## 2022-04-07 ENCOUNTER — APPOINTMENT (OUTPATIENT)
Dept: MRI IMAGING | Facility: CLINIC | Age: 72
End: 2022-04-07
Payer: MEDICARE

## 2022-04-07 PROCEDURE — G1004: CPT

## 2022-04-07 PROCEDURE — 73721 MRI JNT OF LWR EXTRE W/O DYE: CPT | Mod: RT,ME

## 2022-04-18 ENCOUNTER — APPOINTMENT (OUTPATIENT)
Dept: ORTHOPEDIC SURGERY | Facility: CLINIC | Age: 72
End: 2022-04-18

## 2022-04-27 ENCOUNTER — APPOINTMENT (OUTPATIENT)
Dept: ORTHOPEDIC SURGERY | Facility: CLINIC | Age: 72
End: 2022-04-27
Payer: MEDICARE

## 2022-04-27 VITALS — WEIGHT: 141 LBS | BODY MASS INDEX: 24.07 KG/M2 | HEIGHT: 64 IN

## 2022-04-27 DIAGNOSIS — M23.91 UNSPECIFIED INTERNAL DERANGEMENT OF RIGHT KNEE: ICD-10-CM

## 2022-04-27 PROCEDURE — 99213 OFFICE O/P EST LOW 20 MIN: CPT

## 2022-04-27 NOTE — HISTORY OF PRESENT ILLNESS
[de-identified] : Since last assessment patient reports Resolution of pain over medial aspect right knee.  He does experience occasional "ache" when going up stairs.  Denies locking giving out.

## 2022-04-27 NOTE — PHYSICAL EXAM

## 2022-04-27 NOTE — DISCUSSION/SUMMARY
[de-identified] : Impression osteoarthritis right knee, tear medial meniscus\par Recommendation given patient's improving clinical presentation will treat symptomatically.  Follow-up 1 to 2 months

## 2022-06-01 ENCOUNTER — APPOINTMENT (OUTPATIENT)
Dept: ORTHOPEDIC SURGERY | Facility: CLINIC | Age: 72
End: 2022-06-01

## 2022-06-06 ENCOUNTER — APPOINTMENT (OUTPATIENT)
Dept: ORTHOPEDIC SURGERY | Facility: CLINIC | Age: 72
End: 2022-06-06
Payer: MEDICARE

## 2022-06-06 DIAGNOSIS — M17.11 UNILATERAL PRIMARY OSTEOARTHRITIS, RIGHT KNEE: ICD-10-CM

## 2022-06-06 DIAGNOSIS — M23.91 UNSPECIFIED INTERNAL DERANGEMENT OF RIGHT KNEE: ICD-10-CM

## 2022-06-06 PROCEDURE — 99213 OFFICE O/P EST LOW 20 MIN: CPT

## 2022-06-06 NOTE — HISTORY OF PRESENT ILLNESS
[de-identified] : Patient seen in follow-up for right knee.  Patient continues to report right knee is essentially pain-free.  Occasionally patient experiences aching discomfort that resolved spontaneously.  She also notes some swelling but no locking or giving out.  Reports that right knee does not "keep me from doing anything".  She is able to go up and down stairs normally and denies locking or giving out.  Is walking independently

## 2022-06-06 NOTE — DISCUSSION/SUMMARY
[de-identified] : Impression osteoarthritis right knee, tear medial meniscus\par Recommendation given patient's improving clinical presentation will treat symptomatically.  Follow-up

## 2022-07-13 ENCOUNTER — APPOINTMENT (OUTPATIENT)
Dept: CARDIOLOGY | Facility: CLINIC | Age: 72
End: 2022-07-13

## 2022-07-13 ENCOUNTER — NON-APPOINTMENT (OUTPATIENT)
Age: 72
End: 2022-07-13

## 2022-07-13 VITALS
WEIGHT: 144 LBS | HEIGHT: 64 IN | SYSTOLIC BLOOD PRESSURE: 176 MMHG | OXYGEN SATURATION: 99 % | DIASTOLIC BLOOD PRESSURE: 84 MMHG | BODY MASS INDEX: 24.59 KG/M2 | HEART RATE: 62 BPM

## 2022-07-13 PROCEDURE — 99214 OFFICE O/P EST MOD 30 MIN: CPT

## 2022-07-13 PROCEDURE — 93000 ELECTROCARDIOGRAM COMPLETE: CPT

## 2022-07-13 NOTE — PHYSICAL EXAM
[Well Developed] : well developed [Well Nourished] : well nourished [No Acute Distress] : no acute distress [Normal Conjunctiva] : normal conjunctiva [Normal Venous Pressure] : normal venous pressure [Normal S1, S2] : normal S1, S2 [No Carotid Bruit] : no carotid bruit [No Murmur] : no murmur [No Rub] : no rub [No Gallop] : no gallop [Clear Lung Fields] : clear lung fields [Good Air Entry] : good air entry [No Respiratory Distress] : no respiratory distress  [Non Tender] : non-tender [Soft] : abdomen soft [No Masses/organomegaly] : no masses/organomegaly [Normal Bowel Sounds] : normal bowel sounds [Normal Gait] : normal gait [No Edema] : no edema [No Cyanosis] : no cyanosis [No Varicosities] : no varicosities [No Clubbing] : no clubbing [No Rash] : no rash [No Skin Lesions] : no skin lesions [Moves all extremities] : moves all extremities [No Focal Deficits] : no focal deficits [Normal Speech] : normal speech [Alert and Oriented] : alert and oriented [Normal memory] : normal memory

## 2022-07-13 NOTE — HISTORY OF PRESENT ILLNESS
[FreeTextEntry1] : Anali presented to the office today for cardiovascular evaluation.  I saw her 6 months ago.\par \par She is now 72 years old, with a history of hypertension.  She is not known to have any underlying structural heart disease.\par \par At baseline, she is active.  She rows 10,000 m on a regular basis.  With these and other physical activities, she feels well, without reproducible chest discomfort or shortness of breath suggestive of angina.  She denies orthopnea, PND and edema.  She denies dizziness and syncope.\par \par I saw her in January, 2022, at which time she reported some irregularities in her heart rhythm, and also noticed that she had been anxious.  I felt that this, as well as her blood pressure, were a manifestation of anxiety, and that no additional cardiac testing was needed at that time.  \par \par Her blood pressure has been in the 140s at home at times, but is usually in the 120s.

## 2022-07-13 NOTE — DISCUSSION/SUMMARY
[FreeTextEntry1] : Team feels well.  Her blood pressure in the office is always a bit elevated, largely on the basis of reactive hypertension.  Her blood pressure is at times elevated at home as well, though she does say that most of the time its in the 120s.  She will start checking this somewhat more regularly.  She might require some additional medication.\par \par She will have blood work done, which has not been done in at least the last 6 months.  I will be in contact with her to discuss the results.  No other additional testing is needed.  I reviewed her echocardiogram from January 2022, her Holter monitor from January, 2022, and her stress test from August 2018.

## 2022-07-24 LAB
ALBUMIN SERPL ELPH-MCNC: 4.5 G/DL
ALP BLD-CCNC: 71 U/L
ALT SERPL-CCNC: 17 U/L
ANION GAP SERPL CALC-SCNC: 11 MMOL/L
AST SERPL-CCNC: 25 U/L
BASOPHILS # BLD AUTO: 0.03 K/UL
BASOPHILS NFR BLD AUTO: 0.6 %
BILIRUB SERPL-MCNC: 0.5 MG/DL
BUN SERPL-MCNC: 22 MG/DL
CALCIUM SERPL-MCNC: 9.8 MG/DL
CHLORIDE SERPL-SCNC: 98 MMOL/L
CHOLEST SERPL-MCNC: 216 MG/DL
CO2 SERPL-SCNC: 27 MMOL/L
CREAT SERPL-MCNC: 0.82 MG/DL
EGFR: 76 ML/MIN/1.73M2
EOSINOPHIL # BLD AUTO: 0.03 K/UL
EOSINOPHIL NFR BLD AUTO: 0.6 %
ESTIMATED AVERAGE GLUCOSE: 111 MG/DL
GLUCOSE SERPL-MCNC: 108 MG/DL
HBA1C MFR BLD HPLC: 5.5 %
HCT VFR BLD CALC: 38.4 %
HDLC SERPL-MCNC: 92 MG/DL
HGB BLD-MCNC: 13.1 G/DL
IMM GRANULOCYTES NFR BLD AUTO: 0.4 %
LDLC SERPL CALC-MCNC: 109 MG/DL
LYMPHOCYTES # BLD AUTO: 1.69 K/UL
LYMPHOCYTES NFR BLD AUTO: 33.3 %
MAN DIFF?: NORMAL
MCHC RBC-ENTMCNC: 32.9 PG
MCHC RBC-ENTMCNC: 34.1 GM/DL
MCV RBC AUTO: 96.5 FL
MONOCYTES # BLD AUTO: 0.74 K/UL
MONOCYTES NFR BLD AUTO: 14.6 %
NEUTROPHILS # BLD AUTO: 2.56 K/UL
NEUTROPHILS NFR BLD AUTO: 50.5 %
NONHDLC SERPL-MCNC: 123 MG/DL
PLATELET # BLD AUTO: 223 K/UL
POTASSIUM SERPL-SCNC: 5.1 MMOL/L
PROT SERPL-MCNC: 6.3 G/DL
RBC # BLD: 3.98 M/UL
RBC # FLD: 12.4 %
SODIUM SERPL-SCNC: 136 MMOL/L
TRIGL SERPL-MCNC: 75 MG/DL
TSH SERPL-ACNC: 1.57 UIU/ML
WBC # FLD AUTO: 5.07 K/UL

## 2022-08-10 ENCOUNTER — OUTPATIENT (OUTPATIENT)
Dept: OUTPATIENT SERVICES | Facility: HOSPITAL | Age: 72
LOS: 1 days | End: 2022-08-10
Payer: MEDICARE

## 2022-08-10 ENCOUNTER — APPOINTMENT (OUTPATIENT)
Dept: RADIOLOGY | Facility: IMAGING CENTER | Age: 72
End: 2022-08-10

## 2022-08-10 ENCOUNTER — APPOINTMENT (OUTPATIENT)
Dept: MAMMOGRAPHY | Facility: IMAGING CENTER | Age: 72
End: 2022-08-10

## 2022-08-10 DIAGNOSIS — Z96.642 PRESENCE OF LEFT ARTIFICIAL HIP JOINT: Chronic | ICD-10-CM

## 2022-08-10 DIAGNOSIS — Z00.8 ENCOUNTER FOR OTHER GENERAL EXAMINATION: ICD-10-CM

## 2022-08-10 PROCEDURE — 77063 BREAST TOMOSYNTHESIS BI: CPT | Mod: 26

## 2022-08-10 PROCEDURE — 77067 SCR MAMMO BI INCL CAD: CPT | Mod: 26

## 2022-08-10 PROCEDURE — 77063 BREAST TOMOSYNTHESIS BI: CPT

## 2022-08-10 PROCEDURE — 77080 DXA BONE DENSITY AXIAL: CPT

## 2022-08-10 PROCEDURE — 77067 SCR MAMMO BI INCL CAD: CPT

## 2022-08-10 PROCEDURE — 77080 DXA BONE DENSITY AXIAL: CPT | Mod: 26

## 2023-01-09 ENCOUNTER — NON-APPOINTMENT (OUTPATIENT)
Age: 73
End: 2023-01-09

## 2023-01-09 ENCOUNTER — APPOINTMENT (OUTPATIENT)
Dept: CARDIOLOGY | Facility: CLINIC | Age: 73
End: 2023-01-09
Payer: MEDICARE

## 2023-01-09 VITALS
OXYGEN SATURATION: 99 % | DIASTOLIC BLOOD PRESSURE: 84 MMHG | HEIGHT: 64 IN | SYSTOLIC BLOOD PRESSURE: 174 MMHG | HEART RATE: 61 BPM | BODY MASS INDEX: 24.92 KG/M2 | WEIGHT: 146 LBS

## 2023-01-09 PROCEDURE — 93000 ELECTROCARDIOGRAM COMPLETE: CPT

## 2023-01-09 PROCEDURE — 99214 OFFICE O/P EST MOD 30 MIN: CPT

## 2023-01-09 NOTE — CARDIOLOGY SUMMARY
[de-identified] : 12/20/21 sr\par 1/11/22 nsr\par July 13, 2022 normal sinus rhythm\par January 9, 2023 normal sinus rhythm

## 2023-01-09 NOTE — DISCUSSION/SUMMARY
[FreeTextEntry1] : Lilia feels well.  Her blood pressure in the office is always a bit elevated, largely on the basis of reactive hypertension.  Her blood pressure is at times elevated at home as well, though she does say that most of the time its in the 120s.  She will start checking this somewhat more regularly.  She might require some additional medication, especially noting some recent issues with mild hyponatremia.  The hyponatremia will be followed up with her primary care physician, and we can reevaluate what her blood pressure is doing, and what her needs are for medication pending the trend in her sodium.\par \par I reviewed her echocardiogram from January 2022, her Holter monitor from January, 2022, and her stress test from August 2018.  I reviewed her available blood work.  No other additional testing seems needed at this time.\par \par I suggested follow-up in about 6 months, or earlier if needed.

## 2023-01-09 NOTE — HISTORY OF PRESENT ILLNESS
[FreeTextEntry1] : Anali presented to the office today for cardiovascular evaluation.  I saw her 6 months ago.\par \par She is now 72 years old, with a history of hypertension.  She is not known to have any underlying structural heart disease.\par \par At baseline, she is active.  She she has been known to row 10,000 m on a regular basis in the past.  With these and other physical activities, she feels well, without reproducible chest discomfort or shortness of breath suggestive of angina.  She denies orthopnea, PND and edema.  She denies dizziness and syncope.\par \par I saw her in January, 2022, at which time she reported some irregularities in her heart rhythm, and also noticed that she had been anxious.  I felt that this, as well as her blood pressure, were a manifestation of anxiety, and that no additional cardiac testing was needed at that time.  \par \par I saw her again in July 2022.  At that time her blood pressure was elevated, but I again felt that this was on the basis of reactive hypertension.\par \par She presents to the office today having been feeling well.  She reports no chest discomfort or shortness of breath suggestive of angina.  She denies orthopnea, PND and lower extremity edema.  She denies palpitations, dizziness and syncope.  Her blood pressure has been in the 140s at home at times, but is usually in the 120s. Her  daughters have been noticing some memory issues, and are concerned. She was recently noted to have hyponatremia and her HCTZ was cut in half.  She is going to follow-up with her primary care physician because of the.

## 2023-02-07 NOTE — ED ADULT NURSE NOTE - OBJECTIVE STATEMENT
[___] : [unfilled]
Present to ER with c/o of midsternal chest tightness without radiation on and off x a few hours. Pt has history of acid reflex. Pt also states she took medication with relief.

## 2023-07-11 ENCOUNTER — APPOINTMENT (OUTPATIENT)
Dept: NEUROLOGY | Facility: CLINIC | Age: 73
End: 2023-07-11

## 2023-11-21 ENCOUNTER — NON-APPOINTMENT (OUTPATIENT)
Age: 73
End: 2023-11-21

## 2023-11-21 ENCOUNTER — APPOINTMENT (OUTPATIENT)
Dept: CARDIOLOGY | Facility: CLINIC | Age: 73
End: 2023-11-21
Payer: MEDICARE

## 2023-11-21 VITALS
HEART RATE: 68 BPM | OXYGEN SATURATION: 97 % | DIASTOLIC BLOOD PRESSURE: 85 MMHG | WEIGHT: 150 LBS | BODY MASS INDEX: 25.61 KG/M2 | SYSTOLIC BLOOD PRESSURE: 158 MMHG | HEIGHT: 64 IN

## 2023-11-21 DIAGNOSIS — I10 ESSENTIAL (PRIMARY) HYPERTENSION: ICD-10-CM

## 2023-11-21 PROCEDURE — 93000 ELECTROCARDIOGRAM COMPLETE: CPT

## 2023-11-21 PROCEDURE — 99214 OFFICE O/P EST MOD 30 MIN: CPT

## 2023-11-21 RX ORDER — ROSUVASTATIN CALCIUM 20 MG/1
20 TABLET, FILM COATED ORAL
Refills: 0 | Status: ACTIVE | COMMUNITY

## 2023-11-21 RX ORDER — HYDROCHLOROTHIAZIDE 12.5 MG/1
12.5 TABLET ORAL DAILY
Qty: 90 | Refills: 3 | Status: DISCONTINUED | COMMUNITY
End: 2023-11-21

## 2023-12-07 ENCOUNTER — APPOINTMENT (OUTPATIENT)
Dept: MAMMOGRAPHY | Facility: IMAGING CENTER | Age: 73
End: 2023-12-07
Payer: MEDICARE

## 2023-12-07 ENCOUNTER — OUTPATIENT (OUTPATIENT)
Dept: OUTPATIENT SERVICES | Facility: HOSPITAL | Age: 73
LOS: 1 days | End: 2023-12-07
Payer: MEDICARE

## 2023-12-07 DIAGNOSIS — Z96.642 PRESENCE OF LEFT ARTIFICIAL HIP JOINT: Chronic | ICD-10-CM

## 2023-12-07 DIAGNOSIS — Z00.8 ENCOUNTER FOR OTHER GENERAL EXAMINATION: ICD-10-CM

## 2023-12-07 PROCEDURE — 77067 SCR MAMMO BI INCL CAD: CPT

## 2023-12-07 PROCEDURE — 77063 BREAST TOMOSYNTHESIS BI: CPT

## 2023-12-07 PROCEDURE — 77063 BREAST TOMOSYNTHESIS BI: CPT | Mod: 26

## 2023-12-07 PROCEDURE — 77067 SCR MAMMO BI INCL CAD: CPT | Mod: 26

## 2023-12-11 ENCOUNTER — APPOINTMENT (OUTPATIENT)
Dept: MAMMOGRAPHY | Facility: IMAGING CENTER | Age: 73
End: 2023-12-11
Payer: MEDICARE

## 2023-12-11 ENCOUNTER — APPOINTMENT (OUTPATIENT)
Dept: ULTRASOUND IMAGING | Facility: IMAGING CENTER | Age: 73
End: 2023-12-11
Payer: MEDICARE

## 2023-12-11 ENCOUNTER — OUTPATIENT (OUTPATIENT)
Dept: OUTPATIENT SERVICES | Facility: HOSPITAL | Age: 73
LOS: 1 days | End: 2023-12-11
Payer: MEDICARE

## 2023-12-11 DIAGNOSIS — Z96.642 PRESENCE OF LEFT ARTIFICIAL HIP JOINT: Chronic | ICD-10-CM

## 2023-12-11 DIAGNOSIS — Z00.8 ENCOUNTER FOR OTHER GENERAL EXAMINATION: ICD-10-CM

## 2023-12-11 PROCEDURE — G0279: CPT | Mod: 26

## 2023-12-11 PROCEDURE — 76642 ULTRASOUND BREAST LIMITED: CPT

## 2023-12-11 PROCEDURE — 77065 DX MAMMO INCL CAD UNI: CPT | Mod: 26,LT

## 2023-12-11 PROCEDURE — 76642 ULTRASOUND BREAST LIMITED: CPT | Mod: 26,LT

## 2023-12-11 PROCEDURE — 77065 DX MAMMO INCL CAD UNI: CPT

## 2023-12-11 PROCEDURE — G0279: CPT

## 2024-01-17 NOTE — ED PROVIDER NOTE - NS ED MD DISPO DISCHARGE
Anesthesia Pre Eval Note    Anesthesia ROS/Med Hx    Overall Review:  EKG was reviewed and Echo was reviewed       Pulmonary Review:  Positive for pneumonia    Neuro/Psych Review:       Positive for CVA - residual symptoms  Positive for psychiatric history - Depression and Anxiety    Cardiovascular Review:   Comments: Echo :    Result Text  Final Impressions    * Technically difficult study.    * Mild global LV systolic dysfunction.    * Mild RV enlargement with mildly  radial RV function and preserved  longitudinal function.    * Unremarkable valvular structures.    * Incomplete TR spectral Doppler envelope precludes accurate assessment of  PASP.        Positive for CAD  Positive for hypertension  Positive for hyperlipidemia    GI/HEPATIC/RENAL Review:     Positive for GERD  Positive for renal disease    End/Other Review:    Positive for obesity class I - 30.00 - 34.99  Positive for cancer    Overall Review of Systems Comments:  PFO  Additional Results:  EKG:  Encounter Date: 23  -ECG:        Result                      Value                           Ventricular Rate EKG/M*     93                              Atrial Rate (BPM)           93                              OR-Interval (MSEC)          146                             QRS-Interval (MSEC)         130                             QT-Interval (MSEC)          400                             QTc                         497                             P Axis (Degrees)            84                              R Axis (Degrees)            -74                             T Axis (Degrees)            89                              REPORT TEXT                                             Normal sinus rhythm   Right bundle branch block   Left anterior fascicular block    Bifascicular block   Abnormal ECG   When compared with ECG of   2022 11:53,   No significant change was found   Confirmed by APURVA CAMARGO, OFELIA CHRISTINE ([id number]) on 2023  12:45:40 PM       ALLERGIES:   -- Amantadine -- WEAKNESS and Other (See Comments)    --  Stroke like symptoms. tingling   -- Gabapentin -- Other (See Comments)    --  respiratory depression and confusion, hypercarbic.   -- Lisinopril -- Cough   Last Labs        Component                Value               Date/Time                  WBC                      10.2                01/03/2024 0537            RBC                      3.73 (L)            01/03/2024 0537            HGB                      11.5 (L)            01/03/2024 0537            HCT                      36.2                01/03/2024 0537            MCV                      97.1                01/03/2024 0537            MCH                      30.8                01/03/2024 0537            MCHC                     31.8 (L)            01/03/2024 0537            RDW-CV                   14.6                01/03/2024 0537            Sodium                   141                 01/03/2024 0537            Potassium                4.0                 01/03/2024 0537            Chloride                 104                 01/03/2024 0537            Carbon Dioxide           31                  01/03/2024 0537            Glucose                  96                  01/03/2024 0537            BUN                      10                  01/03/2024 0537            Creatinine               0.71                01/03/2024 0537            Glomerular Filtrati*     >90                 01/03/2024 0537            Calcium                  9.4                 01/03/2024 0537            PLT                      456 (H)             01/03/2024 0537            INR                      1.0                 12/21/2023 0427        Prior to Admission medications :  Medication OLANZapine (ZyPREXA) 2.5 MG tablet, Sig Take 2.5 mg by mouth in the morning and 2.5 mg in the evening., Start Date , End Date , Taking? Yes, Authorizing Provider Provider, Outside    Medication  acetaminophen (TYLENOL) 325 MG tablet, Sig Take 650 mg by mouth every 6 hours as needed for Pain or Fever., Start Date , End Date , Taking? Yes, Authorizing Provider Provider, Outside    Medication bisacodyl (DULCOLAX) 10 MG suppository, Sig Place 10 mg rectally daily as needed for Constipation., Start Date , End Date , Taking? Yes, Authorizing Provider Provider, Outside    Medication sodium biphosphate (FLEET) 7-19 GM/118ML enema, Sig Place 1 enema rectally daily as needed for Constipation., Start Date , End Date , Taking? Yes, Authorizing Provider Provider, Outside    Medication magnesium hydroxide (Milk of Magnesia) 400 MG/5ML suspension, Sig Take 30 mLs by mouth daily as needed for Constipation. Do not give if creatinine is >1.4, Start Date , End Date , Taking? Yes, Authorizing Provider Provider, Outside    Medication clonazePAM (KlonoPIN) 1 MG tablet, Sig Take 1 tablet by mouth nightly., Start Date 1/3/24, End Date , Taking? Yes, Authorizing Provider Jessica Moreno MD    Medication Ascorbic Acid (vitamin C) 1000 MG tablet, Sig Take 1,000 mg by mouth daily., Start Date , End Date , Taking? Yes, Authorizing Provider Provider, Outside    Medication acetaminophen (TYLENOL) 500 MG tablet, Sig Take 1,000 mg by mouth as needed for Pain., Start Date , End Date , Taking? Yes, Authorizing Provider Provider, Outside    Medication pantoprazole (PROTONIX) 40 MG tablet, Sig TAKE 1 TABLET BY MOUTH IN THE MORNING AND IN THE EVENING, Start Date 11/27/23, End Date , Taking? Yes, Authorizing Provider Gaby Powell MD    Medication atorvastatin (LIPITOR) 40 MG tablet, Sig TAKE 1 TABLET BY MOUTH EVERY NIGHT  Patient taking differently: Take 40 mg by mouth nightly., Start Date 10/30/23, End Date , Taking? Yes, Authorizing Provider Gaby Powell MD    Medication bisoprolol-hydrochlorothiazide (ZIAC) 5-6.25 MG per tablet, Sig Take 1 tablet by mouth daily., Start Date 10/18/23, End Date , Taking? Yes, Authorizing Provider  Gaby Powell MD    Medication albuterol 108 (90 Base) MCG/ACT inhaler, Sig Inhale 2 puffs into the lungs every 4 hours as needed for Shortness of Breath or Wheezing., Start Date 10/18/23, End Date , Taking? Yes, Authorizing Provider Gaby Powell MD    Medication ibandronate (BONIVA) 150 MG tablet, Sig TAKE 1 TABLET BY MOUTH EVERY 30 DAYS, Start Date 9/25/23, End Date , Taking? Yes, Authorizing Provider Gaby Powell MD    Medication calcium carbonate-vitamin D (CALTRATE+D) 600-400 MG-UNIT per tablet, Sig Take 1 tablet by mouth in the morning and 1 tablet in the evening., Start Date , End Date , Taking? Yes, Authorizing Provider Provider, Outside    Medication polyethylene glycol (MIRALAX) 17 GM/SCOOP powder, Sig Take 17 g by mouth in the morning and 17 g in the evening. Indications: Constipation., Start Date , End Date , Taking? Yes, Authorizing Provider Provider, Outside    Medication vitamin - therapeutic multivitamins w/minerals (CENTRUM SILVER,THERA-M) Tab, Sig Take 1 tablet by mouth daily., Start Date 11/17/16, End Date , Taking? Yes, Authorizing Provider Roxi Colbert MD    Medication ciprofloxacin (Cipro) 500 MG tablet, Sig Take 1 tablet by mouth in the morning and 1 tablet in the evening., Start Date 1/15/24, End Date , Taking? , Authorizing Provider Denisha Gamboa APNP            Relevant Problems   No relevant active problems       Physical Exam     Airway   Mallampati: III  TM Distance: <3 FB  Neck ROM: Limited    Cardiovascular    Cardio Rhythm: Regular  Cardio Rate: Abnormal    Head Assessment  Head assessment: Normocephalic and Atraumatic    General Assessment  General Assessment: Alert and oriented and Mild distress    Dental Exam      Legend: C=Chipped  M=Missing  L=Loose B=Bridge Cr=[place] I=Implant    Pulmonary Exam    Patient Demonstrates:  Decreased Breath Sounds and Rales    Abdominal Exam  Abdominal exam normal    Other Findings  Discussed risk of prolonged intubation - patient and  family in aggreament      Anesthesia Plan:    ASA Status: 3  Anesthesia Type: General    Induction: Intravenous  Preferred Airway Type: ETT  Maintenance: Inhalational  Premedication: Oral    Patient does not have an implantable electronic device requiring post procedure programming.     Post-op Pain Management: Per Surgeon      Checklist  Reviewed: Lab Results, EKG, Consultations, Beta Blocker Status, Care Everywhere, Medications, Problem list, Past Med History, Anesthesia Record, Patient Summary, Allergies, Outside Records, Nursing Notes, DNR Status and NPO Status  Monitors  Discussed risks of the following Invasive monitors with patient: Arterial Line       Home

## 2024-05-20 ENCOUNTER — NON-APPOINTMENT (OUTPATIENT)
Age: 74
End: 2024-05-20

## 2024-05-20 ENCOUNTER — APPOINTMENT (OUTPATIENT)
Dept: CARDIOLOGY | Facility: CLINIC | Age: 74
End: 2024-05-20
Payer: MEDICARE

## 2024-05-20 VITALS
WEIGHT: 145 LBS | BODY MASS INDEX: 24.75 KG/M2 | HEIGHT: 64 IN | DIASTOLIC BLOOD PRESSURE: 77 MMHG | SYSTOLIC BLOOD PRESSURE: 135 MMHG | HEART RATE: 66 BPM | OXYGEN SATURATION: 100 %

## 2024-05-20 PROCEDURE — 99214 OFFICE O/P EST MOD 30 MIN: CPT

## 2024-05-20 PROCEDURE — G2211 COMPLEX E/M VISIT ADD ON: CPT

## 2024-05-20 PROCEDURE — 93000 ELECTROCARDIOGRAM COMPLETE: CPT

## 2024-05-20 NOTE — HISTORY OF PRESENT ILLNESS
[FreeTextEntry1] : Anali presented to the office today for cardiovascular evaluation.  I saw her 6 months ago.  She is now 73 years old, with a history of hypertension.  She is not known to have any underlying structural heart disease.  At baseline, she is active.  She has been known to row 10,000m on a regular basis in the past.  With these and other physical activities, she feels well, without reproducible chest discomfort or shortness of breath suggestive of angina.  She denies orthopnea, PND and edema.  She denies dizziness and syncope.  I saw her in January, 2022, at which time she reported some irregularities in her heart rhythm, and also noticed that she had been anxious.  I felt that this, as well as her blood pressure, were a manifestation of anxiety, and that no additional cardiac testing was needed at that time.    I saw her again in July 2022.  At that time her blood pressure was elevated, but I again felt that this was on the basis of reactive hypertension.  I saw her in January, 2023, and she was feeling well.  Her blood pressure has been reasonable.  Her memory had been deteriorating, and her family was concerned.  She was found to have hyponatremia, and there was planned follow-up with her primary care physician.  She presents to the office today having been feeling well.  She reports no chest discomfort or shortness of breath suggestive of angina.  She denies orthopnea, PND and lower extremity edema.  She denies palpitations, dizziness and syncope.  She developed hyponatremia from the chlorothiazide, and so this was discontinued.  She was started on amlodipine instead.  Her blood pressure has been decent at home, in the 130/80 range. She reports having had blood work recently with her primary care physician.

## 2024-05-20 NOTE — DISCUSSION/SUMMARY
[FreeTextEntry1] : Lilia feels well.  Her blood pressure in the office is always a bit elevated, largely on the basis of reactive hypertension.  Her blood pressure is at times elevated at home as well.  She will increase amlodipine up to 10 mg daily.     I reviewed her echocardiogram from January 2022, her Holter monitor from January, 2022, and her stress test from August 2018.  I reviewed her available blood work.  No other additional testing seems needed at this time.  I suggested follow-up in about 6 months, or earlier if needed. [EKG obtained to assist in diagnosis and management of assessed problem(s)] : EKG obtained to assist in diagnosis and management of assessed problem(s)

## 2024-05-20 NOTE — CARDIOLOGY SUMMARY
[de-identified] : 12/20/21 sr 1/11/22 nsr July 13, 2022 normal sinus rhythm November 21, 2023 normal sinus rhythm January 9, 2023 normal sinus rhythm 5/20/24 nsr

## 2024-06-25 ENCOUNTER — EMERGENCY (EMERGENCY)
Facility: HOSPITAL | Age: 74
LOS: 1 days | Discharge: HOME | End: 2024-06-25
Attending: STUDENT IN AN ORGANIZED HEALTH CARE EDUCATION/TRAINING PROGRAM | Admitting: STUDENT IN AN ORGANIZED HEALTH CARE EDUCATION/TRAINING PROGRAM
Payer: MEDICARE

## 2024-06-25 VITALS
TEMPERATURE: 98 F | HEART RATE: 96 BPM | OXYGEN SATURATION: 99 % | DIASTOLIC BLOOD PRESSURE: 62 MMHG | SYSTOLIC BLOOD PRESSURE: 108 MMHG | RESPIRATION RATE: 18 BRPM

## 2024-06-25 VITALS
WEIGHT: 145.06 LBS | TEMPERATURE: 98 F | SYSTOLIC BLOOD PRESSURE: 119 MMHG | RESPIRATION RATE: 16 BRPM | OXYGEN SATURATION: 97 % | HEART RATE: 119 BPM | DIASTOLIC BLOOD PRESSURE: 87 MMHG

## 2024-06-25 DIAGNOSIS — Z96.642 PRESENCE OF LEFT ARTIFICIAL HIP JOINT: Chronic | ICD-10-CM

## 2024-06-25 LAB
ALBUMIN SERPL ELPH-MCNC: 4.3 G/DL — SIGNIFICANT CHANGE UP (ref 3.3–5)
ALP SERPL-CCNC: 72 U/L — SIGNIFICANT CHANGE UP (ref 40–120)
ALT FLD-CCNC: 38 U/L — SIGNIFICANT CHANGE UP (ref 12–78)
ANION GAP SERPL CALC-SCNC: 9 MMOL/L — SIGNIFICANT CHANGE UP (ref 5–17)
APTT BLD: 29.3 SEC — SIGNIFICANT CHANGE UP (ref 24.5–35.6)
AST SERPL-CCNC: 39 U/L — HIGH (ref 15–37)
BASOPHILS # BLD AUTO: 0.01 K/UL — SIGNIFICANT CHANGE UP (ref 0–0.2)
BASOPHILS NFR BLD AUTO: 0.2 % — SIGNIFICANT CHANGE UP (ref 0–2)
BILIRUB SERPL-MCNC: 1.6 MG/DL — HIGH (ref 0.2–1.2)
BUN SERPL-MCNC: 17 MG/DL — SIGNIFICANT CHANGE UP (ref 7–23)
CALCIUM SERPL-MCNC: 9.5 MG/DL — SIGNIFICANT CHANGE UP (ref 8.5–10.1)
CHLORIDE SERPL-SCNC: 100 MMOL/L — SIGNIFICANT CHANGE UP (ref 96–108)
CO2 SERPL-SCNC: 25 MMOL/L — SIGNIFICANT CHANGE UP (ref 22–31)
CREAT SERPL-MCNC: 0.81 MG/DL — SIGNIFICANT CHANGE UP (ref 0.5–1.3)
EGFR: 77 ML/MIN/1.73M2 — SIGNIFICANT CHANGE UP
EOSINOPHIL # BLD AUTO: 0.01 K/UL — SIGNIFICANT CHANGE UP (ref 0–0.5)
EOSINOPHIL NFR BLD AUTO: 0.2 % — SIGNIFICANT CHANGE UP (ref 0–6)
GLUCOSE SERPL-MCNC: 112 MG/DL — HIGH (ref 70–99)
HCT VFR BLD CALC: 35.6 % — SIGNIFICANT CHANGE UP (ref 34.5–45)
HGB BLD-MCNC: 12.1 G/DL — SIGNIFICANT CHANGE UP (ref 11.5–15.5)
IMM GRANULOCYTES NFR BLD AUTO: 0.3 % — SIGNIFICANT CHANGE UP (ref 0–0.9)
INR BLD: 1.01 RATIO — SIGNIFICANT CHANGE UP (ref 0.85–1.18)
LYMPHOCYTES # BLD AUTO: 1.25 K/UL — SIGNIFICANT CHANGE UP (ref 1–3.3)
LYMPHOCYTES # BLD AUTO: 20.3 % — SIGNIFICANT CHANGE UP (ref 13–44)
MCHC RBC-ENTMCNC: 32.9 PG — SIGNIFICANT CHANGE UP (ref 27–34)
MCHC RBC-ENTMCNC: 34 GM/DL — SIGNIFICANT CHANGE UP (ref 32–36)
MCV RBC AUTO: 96.7 FL — SIGNIFICANT CHANGE UP (ref 80–100)
MONOCYTES # BLD AUTO: 0.77 K/UL — SIGNIFICANT CHANGE UP (ref 0–0.9)
MONOCYTES NFR BLD AUTO: 12.5 % — SIGNIFICANT CHANGE UP (ref 2–14)
NEUTROPHILS # BLD AUTO: 4.1 K/UL — SIGNIFICANT CHANGE UP (ref 1.8–7.4)
NEUTROPHILS NFR BLD AUTO: 66.5 % — SIGNIFICANT CHANGE UP (ref 43–77)
NRBC # BLD: 0 /100 WBCS — SIGNIFICANT CHANGE UP (ref 0–0)
PLATELET # BLD AUTO: 204 K/UL — SIGNIFICANT CHANGE UP (ref 150–400)
POTASSIUM SERPL-MCNC: 3.8 MMOL/L — SIGNIFICANT CHANGE UP (ref 3.5–5.3)
POTASSIUM SERPL-SCNC: 3.8 MMOL/L — SIGNIFICANT CHANGE UP (ref 3.5–5.3)
PROT SERPL-MCNC: 7.1 G/DL — SIGNIFICANT CHANGE UP (ref 6–8.3)
PROTHROM AB SERPL-ACNC: 11.8 SEC — SIGNIFICANT CHANGE UP (ref 9.5–13)
RBC # BLD: 3.68 M/UL — LOW (ref 3.8–5.2)
RBC # FLD: 13.2 % — SIGNIFICANT CHANGE UP (ref 10.3–14.5)
SODIUM SERPL-SCNC: 134 MMOL/L — LOW (ref 135–145)
TROPONIN I, HIGH SENSITIVITY RESULT: 6.4 NG/L — SIGNIFICANT CHANGE UP
TSH SERPL-MCNC: 0.59 UIU/ML — SIGNIFICANT CHANGE UP (ref 0.36–3.74)
WBC # BLD: 6.16 K/UL — SIGNIFICANT CHANGE UP (ref 3.8–10.5)
WBC # FLD AUTO: 6.16 K/UL — SIGNIFICANT CHANGE UP (ref 3.8–10.5)

## 2024-06-25 PROCEDURE — 84484 ASSAY OF TROPONIN QUANT: CPT

## 2024-06-25 PROCEDURE — 93010 ELECTROCARDIOGRAM REPORT: CPT

## 2024-06-25 PROCEDURE — 96376 TX/PRO/DX INJ SAME DRUG ADON: CPT

## 2024-06-25 PROCEDURE — 71045 X-RAY EXAM CHEST 1 VIEW: CPT

## 2024-06-25 PROCEDURE — 99285 EMERGENCY DEPT VISIT HI MDM: CPT

## 2024-06-25 PROCEDURE — 99285 EMERGENCY DEPT VISIT HI MDM: CPT | Mod: FS

## 2024-06-25 PROCEDURE — 71045 X-RAY EXAM CHEST 1 VIEW: CPT | Mod: 26

## 2024-06-25 PROCEDURE — 84443 ASSAY THYROID STIM HORMONE: CPT

## 2024-06-25 PROCEDURE — 80053 COMPREHEN METABOLIC PANEL: CPT

## 2024-06-25 PROCEDURE — 85730 THROMBOPLASTIN TIME PARTIAL: CPT

## 2024-06-25 PROCEDURE — 99285 EMERGENCY DEPT VISIT HI MDM: CPT | Mod: 25

## 2024-06-25 PROCEDURE — 85610 PROTHROMBIN TIME: CPT

## 2024-06-25 PROCEDURE — 36415 COLL VENOUS BLD VENIPUNCTURE: CPT

## 2024-06-25 PROCEDURE — 96374 THER/PROPH/DIAG INJ IV PUSH: CPT

## 2024-06-25 PROCEDURE — 93005 ELECTROCARDIOGRAM TRACING: CPT

## 2024-06-25 PROCEDURE — 85025 COMPLETE CBC W/AUTO DIFF WBC: CPT

## 2024-06-25 RX ORDER — DILTIAZEM HCL 120 MG
1 CAPSULE, EXT RELEASE 24 HR ORAL
Qty: 90 | Refills: 0
Start: 2024-06-25 | End: 2024-07-24

## 2024-06-25 RX ORDER — METOPROLOL TARTRATE 50 MG
5 TABLET ORAL ONCE
Refills: 0 | Status: COMPLETED | OUTPATIENT
Start: 2024-06-25 | End: 2024-06-25

## 2024-06-25 RX ORDER — APIXABAN 2.5 MG/1
5 TABLET, FILM COATED ORAL ONCE
Refills: 0 | Status: COMPLETED | OUTPATIENT
Start: 2024-06-25 | End: 2024-06-25

## 2024-06-25 RX ORDER — METOPROLOL TARTRATE 50 MG
1 TABLET ORAL
Qty: 60 | Refills: 0
Start: 2024-06-25 | End: 2024-07-24

## 2024-06-25 RX ORDER — APIXABAN 2.5 MG/1
1 TABLET, FILM COATED ORAL
Qty: 60 | Refills: 0
Start: 2024-06-25 | End: 2024-07-24

## 2024-06-25 RX ORDER — DILTIAZEM HCL 120 MG
30 CAPSULE, EXT RELEASE 24 HR ORAL ONCE
Refills: 0 | Status: COMPLETED | OUTPATIENT
Start: 2024-06-25 | End: 2024-06-25

## 2024-06-25 RX ADMIN — Medication 30 MILLIGRAM(S): at 14:20

## 2024-06-25 RX ADMIN — Medication 5 MILLIGRAM(S): at 13:07

## 2024-06-25 RX ADMIN — Medication 5 MILLIGRAM(S): at 11:47

## 2024-06-25 RX ADMIN — APIXABAN 5 MILLIGRAM(S): 2.5 TABLET, FILM COATED ORAL at 15:24

## 2024-06-25 NOTE — ED PROVIDER NOTE - ATTENDING APP SHARED VISIT CONTRIBUTION OF CARE
I agree with the PA/NP's history/exam/plan as described above, unless otherwise noted below by me.    HPI: This is a 73F with hx of htn, presenting with irregular HR/palpitations that began this morning, woke her from sleep. No hx of this in the past. No recent illness, fever, couh, chills. No chest pain or SOB. She has been on lasix d/t LE edema.     Exam:  Gen: well-appearing, no acute distress  Neuro: awake, alert, oriented x3, normal behavior, HYLTON freely  HEENT: normocephalic, atraumatic, EOMI, uvula midline, no pharyngeal erythema/edema  Lungs: CTABL  CVS: irregular s1/s2, no gallops, rubs  Abd: soft, non-tender, non-distended, no skin changes, no hernia  Extrem: 2+ peripheral symmetric pulses, no ecchymosis, deformity, skin changes, full ROM    Assessment/Plan:  ddx: AF, palps, r/o ACS  plan: labs, coags, trop, ekg, cxr, rate control. CHADVasc 4.   Please refer to dispo tab for MDM/dispo

## 2024-06-25 NOTE — ED PROVIDER NOTE - PROGRESS NOTE DETAILS
seen by cardio medications adjusted stop asa and amlodipine start eliquis cardizem and increase metoprolol hr improved and feeling better f/u out holter

## 2024-06-25 NOTE — ED ADULT TRIAGE NOTE - PATIENT ON (OXYGEN DELIVERY METHOD)
room air

## 2024-06-25 NOTE — ED ADULT NURSE NOTE - ED CARDIAC RHYTHM
afib/irregular Spironolactone Pregnancy And Lactation Text: This medication can cause feminization of the male fetus and should be avoided during pregnancy. The active metabolite is also found in breast milk.

## 2024-06-25 NOTE — ED PROVIDER NOTE - OBJECTIVE STATEMENT
Patient is a 73-year-old female with past medical history hypertension hyperlipidemia coming in complaining of palpitations 5 AM.  Patient states symptoms woke up patient from sleep.  Patient denies any chest pain shortness of breath recent travels leg swelling nausea vomiting dizziness previous symptoms in the past.  Patient is on aspirin as well as metoprolol.     cardio nilton

## 2024-06-25 NOTE — CONSULT NOTE ADULT - ASSESSMENT
73-year-old woman with a history of hypertension.  She does not have a history of hypercholesterolemia or diabetes, and she is not known to have any meaningful structural heart disease.  She was seen in my office May 20, 2024, at which time she was feeling well.  She has had a history of palpitations in the past, which have been attributable to anxiety, and which have been an infrequent phenomenon.  She is active at baseline, with rowing and other activities, and does not report baseline symptoms of angina or heart failure.    She was in her usual state of health until this morning.  She was awakened by palpitations, further describes a sense that her heart was beating quickly and irregularly.  She went to urgent care to get checked and an EKG revealed rapid atrial fibrillation.  She came to the emergency department for further evaluation.  She was given a dose of metoprolol.  Blood work has thus far been unremarkable.  Chest x-ray is unremarkable.  Cardiology consultation is now being obtained.    The patient is now admitted with atrial fibrillation, with a rapid ventricular response.  She has not had this diagnosis previously.  Prior palpitations have been attributed to anxiety, and not arrhythmia.    Her heart rate is accelerated, but this may be controllable with the use of oral medications in the emergency department.  If her heart rate is controllable on oral medication, we can consider the use of Eliquis, and we can consider completing her evaluation as an outpatient, to include an echocardiogram and an extended Holter.  I would give her diltiazem 30 mg orally.  I would discontinue amlodipine.  I would continue metoprolol twice daily.  If she is to be admitted, I would further uptitrate her medications, and obtain an echocardiogram while she is in the hospital.  I would monitor continuous telemetry. Check TSH.

## 2024-06-25 NOTE — ED PROVIDER NOTE - NSFOLLOWUPINSTRUCTIONS_ED_ALL_ED_FT
Follow up with cardiology   stop aspirin and amlodipine  start Cardizem and Eliquis  your metoprolol dose was increased to 75 mg twice a day  return to er for any worsening symptoms       Atrial Fibrillation  Atrial fibrillation (AFib) is a type of irregular or rapid heartbeat (arrhythmia). In AFib, the top part of the heart (atria) beats in an irregular pattern. This makes the heart unable to pump blood normally and effectively.    The goal of treatment is to prevent blood clots from forming, control your heart rate, or restore your heartbeat to a normal rhythm. If this condition is not treated, it can cause serious problems, such as a weakened heart muscle (cardiomyopathy) or a stroke.    What are the causes?  A heart with normal electrical activity and a heart with abnormal electrical activity.  This condition is often caused by medical conditions that damage the heart's electrical system. These include:  High blood pressure (hypertension). This is the most common cause.  Certain heart problems or conditions, such as heart failure, coronary artery disease, heart valve problems, or heart surgery.  Diabetes.  Overactive thyroid (hyperthyroidism).  Chronic kidney disease.  Certain lung conditions, such as emphysema, pneumonia, or COPD.  Obstructive sleep apnea.  In some cases, the cause of this condition is not known.    What increases the risk?  This condition is more likely to develop in:  Older adults.  Athletes who do endurance exercise.  People who have a family history of AFib.  Males.  People who are .  People who are obese.  People who smoke or misuse alcohol.  What are the signs or symptoms?  Symptoms of this condition include:  Fast or irregular heartbeats (palpitations).  Discomfort or pain in your chest.  Shortness of breath.  Sudden light-headedness or weakness.  Tiring easily during exercise or activity.  Syncope (fainting).  Sweating.  In some cases, there are no symptoms.    How is this diagnosed?  Your health care provider may detect AFib when taking your pulse. If detected, this condition may be diagnosed with:  An electrocardiogram (ECG) to check electrical signals of the heart.  An ambulatory cardiac monitor to record your heart's activity for a few days.  A transthoracic echocardiogram (TTE) to create pictures of your heart.  A transesophageal echocardiogram (BILLY) to create even clearer pictures of your heart.  A stress test to check your blood supply while you exercise.  Imaging tests, such as a CT scan or chest X-ray.  Blood tests.  How is this treated?  Treatment depends on underlying conditions and how you feel when you get AFib. This condition may be treated with:  Medicines to prevent blood clots or to treat heart rate or heart rhythm problems.  Electrical cardioversion to reset the heart's rhythm.  A pacemaker to correct abnormal heart rhythm.  Ablation to remove the heart tissue that sends abnormal signals.  Left atrial appendage closure to seal the area where blood clots can form.  In some cases, underlying conditions will be treated.    Follow these instructions at home:  Medicines    Take over-the counter and prescription medicines only as told by your provider.  Do not take any new medicines without talking to your provider.  If you are taking blood thinners:  Talk with your provider before taking aspirin or NSAIDs. These medicines can raise your risk of bleeding.  Take your medicines as told. Take them at the same time each day.  Do not do things that could hurt or bruise you. Be careful to avoid falls.  Wear an alert bracelet or carry a card that says that you take blood thinners.  Lifestyle    Do not use any products that contain nicotine or tobacco. These products include cigarettes, chewing tobacco, and vaping devices, such as e-cigarettes. If you need help quitting, ask your provider.  Eat heart-healthy foods. Talk with a food expert (dietitian) to make an eating plan that is right for you.  Exercise regularly as told by your provider.  Do not drink alcohol.  Lose weight if you are overweight.  General instructions    If you have obstructive sleep apnea, manage your condition as told by your provider.  Do not use diet pills unless your provider approves. Diet pills can make heart problems worse.  Keep all follow-up visits. Your provider will want to check your heart rate and rhythm regularly.  Contact a health care provider if:  You notice a change in the rate, rhythm, or strength of your heartbeat.  You are taking a blood thinner and you notice more bruising.  You tire more easily when you exercise or do heavy work.  You have a sudden change in weight.  Get help right away if:  A sign showing the "BE FAST" categories for the warning signs of a stroke: balance, eyes, face, arms, speech, and time.   You have chest pain.  You have trouble breathing.  You have side effects of blood thinners, such as blood in your vomit, poop (stool), or pee (urine), or bleeding that does not stop.  You have any symptoms of a stroke. "BE FAST" is an easy way to remember the main warning signs of a stroke:  B - Balance. Signs are dizziness, sudden trouble walking, or loss of balance.  E - Eyes. Signs are trouble seeing or a sudden change in vision.  F - Face. Signs are sudden weakness or numbness of the face, or the face or eyelid drooping on one side.  A - Arms. Signs are weakness or numbness in an arm. This happens suddenly and usually on one side of the body.  S - Speech.Signs are sudden trouble speaking, slurred speech, or trouble understanding what people say.  T - Time. Time to call emergency services. Write down what time symptoms started.  Other signs of a stroke, such as:  A sudden, severe headache with no known cause.  Nausea or vomiting.  Seizure.  These symptoms may be an emergency. Get help right away. Call 911.  Do not wait to see if the symptoms will go away.  Do not drive yourself to the hospital.  This information is not intended to replace advice given to you by your health care provider. Make sure you discuss any questions you have with your health care provider.

## 2024-06-25 NOTE — ED PROVIDER NOTE - CLINICAL SUMMARY MEDICAL DECISION MAKING FREE TEXT BOX
MDM: workup here in the ED has overall been unremarkable. Patient with adequate rate control after lopressor IV x2. She was seen by cardiology, and plan to start patient on Cardizem PO at home, initiate Eliquis BID, and increase dose of home lopressor. Trop negative. Plan for continued outpatient cardio workup including echo and holter. Patient well-appearing, HD stable. For DC home.

## 2024-06-25 NOTE — ED ADULT TRIAGE NOTE - CHIEF COMPLAINT QUOTE
c/o Woke up in the middle of the night last night with palpitation, sent from  for new onset A-fib, no SOB, no CP

## 2024-06-25 NOTE — ED ADULT NURSE NOTE - NSFALLHARMRISKINTERV_ED_ALL_ED

## 2024-06-25 NOTE — CONSULT NOTE ADULT - SUBJECTIVE AND OBJECTIVE BOX
[facility]         Theresa Dobson, Chris Hoyt Savella        [phone] (office)    CHIEF COMPLAINT: Patient is a 73y old  Female who presents with a chief complaint of palps    HPI:  The patient is a 73-year-old woman with a history of hypertension.  She does not have a history of hypercholesterolemia or diabetes, and she is not known to have any meaningful structural heart disease.  She was seen in my office May 20, 2024, at which time she was feeling well.  She has had a history of palpitations in the past, which have been attributable to anxiety, and which have been an infrequent phenomenon.  She is active at baseline, with rowing and other activities, and does not report baseline symptoms of angina or heart failure.    She was in her usual state of health until this morning.  She was awakened by palpitations, further describes a sense that her heart was beating quickly and irregularly.  She went to urgent care to get checked and an EKG revealed rapid atrial fibrillation.  She came to the emergency department for further evaluation.  She was given a dose of metoprolol.  Blood work has thus far been unremarkable.  Chest x-ray is unremarkable.  Cardiology consultation is now being obtained.    PAST MEDICAL & SURGICAL HISTORY:  Arthritis      GERD (gastroesophageal reflux disease)      Hypertension      History of left hip replacement  2013          SOCIAL HISTORY: No active tobacco, alcohol or illicit drug use    FAMILY HISTORY:   No pertinent family history of CAD    Outpatient medications:    MEDICATIONS  (STANDING):    MEDICATIONS  (PRN):      Allergies    sulfa drugs (Unknown)  Arthrotec (Other)  NSAIDs (Other)  tetracycline (Unknown)    Intolerances        REVIEW OF SYSTEMS: Is negative for eye, ENT, GI, , allergic, dermatologic, musculoskeletal and neurologic, except as described above.    VITAL SIGNS:   Vital Signs Last 24 Hrs  T(C): 36.7 (25 Jun 2024 10:41), Max: 36.7 (25 Jun 2024 10:41)  T(F): 98.1 (25 Jun 2024 10:41), Max: 98.1 (25 Jun 2024 10:41)  HR: 95 (25 Jun 2024 13:35) (95 - 120)  BP: 117/73 (25 Jun 2024 13:35) (117/73 - 130/79)  BP(mean): --  RR: 16 (25 Jun 2024 13:35) (16 - 18)  SpO2: 97% (25 Jun 2024 13:35) (97% - 100%)    Parameters below as of 25 Jun 2024 13:35  Patient On (Oxygen Delivery Method): room air        I&O's Summary      PHYSICAL EXAM:    Constitutional: NAD, awake and alert, well-developed  Eyes:  EOMI, no oral cyanosis, conjunctivae clear, anicteric.  Pulmonary: Non-labored, breath sounds are clear bilaterally, no wheezing, rales or rhonchi  Cardiovascular:  irregular S1 and S2. No murmur.  No rubs, gallops or clicks  Gastrointestinal: Bowel Sounds present, soft, nontender.   Lymph: No peripheral edema.   Neurological: Alert, strength and sensitivity are grossly intact  Skin: No obvious lesions/rashes.   Psych:  Mood & affect appropriate .    LABS: All Labs Reviewed:                        12.1   6.16  )-----------( 204      ( 25 Jun 2024 11:35 )             35.6     25 Jun 2024 11:35    134    |  100    |  17     ----------------------------<  112    3.8     |  25     |  0.81     Ca    9.5        25 Jun 2024 11:35    TPro  7.1    /  Alb  4.3    /  TBili  1.6    /  DBili  x      /  AST  39     /  ALT  38     /  AlkPhos  72     25 Jun 2024 11:35    PT/INR - ( 25 Jun 2024 11:35 )   PT: 11.8 sec;   INR: 1.01 ratio         PTT - ( 25 Jun 2024 11:35 )  PTT:29.3 sec      Blood Culture:         RADIOLOGY:  cxr napd  EKG: af rvr

## 2024-06-25 NOTE — ED ADULT NURSE NOTE - CARDIO ASSESSMENT
Spoke w/pt who states he is feeling fine down without abdominal discomfort. Reports taking some OTC pepto bismol that seemed to relieve his discomfort. Pt thinks he may have experienced an upset stomach from the anesthesia received this morning for procedure. Scheduled 2 week f/u with .   ---

## 2024-06-25 NOTE — ED ADULT NURSE NOTE - OBJECTIVE STATEMENT
Pt received in 11A 73y female AXO 4 is ambulatory from urgent care c/o irregular heartbeat. PMH HTN, HLD. Pt states this morning she developed palpitations in her chest and went to urgent care, Pt found to be in afib and was sent to [facility] to be evaluated. upon assessment pt c/o palpitations. Pt denies SOB, chest pain, abd pain, nausea, vomiting, diarrhea, constipation. Left 20 AC placed, labs drawn, pt placed on cardiac monitor (afib), EKG performed, pt placed on continuous pulse ox saturating 95% and higher,  meds given as prescribed.

## 2024-06-25 NOTE — ED PROVIDER NOTE - PATIENT PORTAL LINK FT
You can access the FollowMyHealth Patient Portal offered by [facility] by registering at the following website: [email or web address]. By joining [facility]’s FollowMyHealth portal, you will also be able to view your health information using other applications (apps) compatible with our system.

## 2024-06-25 NOTE — ED PROVIDER NOTE - CARE PROVIDER_API CALL
Kieran Cardenas  Interventional Cardiology  [address] [place] [address]  Phone: [phone]  Fax: [phone]  Follow Up Time:

## 2024-06-27 ENCOUNTER — EMERGENCY (EMERGENCY)
Facility: HOSPITAL | Age: 74
LOS: 1 days | Discharge: HOME | End: 2024-06-27
Attending: EMERGENCY MEDICINE | Admitting: EMERGENCY MEDICINE
Payer: MEDICARE

## 2024-06-27 VITALS
HEART RATE: 78 BPM | OXYGEN SATURATION: 100 % | DIASTOLIC BLOOD PRESSURE: 66 MMHG | SYSTOLIC BLOOD PRESSURE: 116 MMHG | TEMPERATURE: 98 F | RESPIRATION RATE: 18 BRPM

## 2024-06-27 VITALS
OXYGEN SATURATION: 100 % | WEIGHT: 139.99 LBS | RESPIRATION RATE: 17 BRPM | TEMPERATURE: 98 F | HEART RATE: 85 BPM | SYSTOLIC BLOOD PRESSURE: 131 MMHG | DIASTOLIC BLOOD PRESSURE: 73 MMHG | HEIGHT: 65 IN

## 2024-06-27 DIAGNOSIS — Z96.642 PRESENCE OF LEFT ARTIFICIAL HIP JOINT: Chronic | ICD-10-CM

## 2024-06-27 PROCEDURE — 93010 ELECTROCARDIOGRAM REPORT: CPT

## 2024-06-27 PROCEDURE — 99283 EMERGENCY DEPT VISIT LOW MDM: CPT | Mod: 25

## 2024-06-27 PROCEDURE — 93005 ELECTROCARDIOGRAM TRACING: CPT

## 2024-06-27 PROCEDURE — 99284 EMERGENCY DEPT VISIT MOD MDM: CPT

## 2024-06-27 RX ORDER — METOPROLOL SUCCINATE 50 MG/1
3 TABLET, EXTENDED RELEASE ORAL
Qty: 180 | Refills: 0
Start: 2024-06-27 | End: 2024-07-26

## 2024-06-27 RX ORDER — METOPROLOL SUCCINATE 50 MG/1
75 TABLET, EXTENDED RELEASE ORAL ONCE
Refills: 0 | Status: COMPLETED | OUTPATIENT
Start: 2024-06-27 | End: 2024-06-27

## 2024-06-27 RX ADMIN — METOPROLOL SUCCINATE 75 MILLIGRAM(S): 50 TABLET, EXTENDED RELEASE ORAL at 07:58

## 2024-07-11 ENCOUNTER — APPOINTMENT (OUTPATIENT)
Dept: CARDIOLOGY | Facility: CLINIC | Age: 74
End: 2024-07-11
Payer: MEDICARE

## 2024-07-11 ENCOUNTER — NON-APPOINTMENT (OUTPATIENT)
Age: 74
End: 2024-07-11

## 2024-07-11 VITALS
HEIGHT: 64 IN | OXYGEN SATURATION: 98 % | WEIGHT: 141 LBS | SYSTOLIC BLOOD PRESSURE: 154 MMHG | HEART RATE: 62 BPM | DIASTOLIC BLOOD PRESSURE: 76 MMHG | BODY MASS INDEX: 24.07 KG/M2

## 2024-07-11 VITALS — SYSTOLIC BLOOD PRESSURE: 140 MMHG | DIASTOLIC BLOOD PRESSURE: 68 MMHG

## 2024-07-11 DIAGNOSIS — I10 ESSENTIAL (PRIMARY) HYPERTENSION: ICD-10-CM

## 2024-07-11 DIAGNOSIS — I48.0 PAROXYSMAL ATRIAL FIBRILLATION: ICD-10-CM

## 2024-07-11 DIAGNOSIS — E78.5 HYPERLIPIDEMIA, UNSPECIFIED: ICD-10-CM

## 2024-07-11 PROCEDURE — 93000 ELECTROCARDIOGRAM COMPLETE: CPT

## 2024-07-11 PROCEDURE — 99215 OFFICE O/P EST HI 40 MIN: CPT

## 2024-07-22 ENCOUNTER — APPOINTMENT (OUTPATIENT)
Dept: CARDIOLOGY | Facility: CLINIC | Age: 74
End: 2024-07-22
Payer: MEDICARE

## 2024-07-22 PROCEDURE — 93306 TTE W/DOPPLER COMPLETE: CPT

## 2024-07-27 LAB
BASOPHILS # BLD AUTO: 0.02 K/UL
BASOPHILS NFR BLD AUTO: 0.3 %
EOSINOPHIL # BLD AUTO: 0.01 K/UL
EOSINOPHIL NFR BLD AUTO: 0.2 %
HCT VFR BLD CALC: 35.5 %
HGB BLD-MCNC: 11.7 G/DL
IMM GRANULOCYTES NFR BLD AUTO: 0.2 %
LYMPHOCYTES # BLD AUTO: 1.48 K/UL
LYMPHOCYTES NFR BLD AUTO: 25.8 %
MAN DIFF?: NORMAL
MCHC RBC-ENTMCNC: 33 GM/DL
MCHC RBC-ENTMCNC: 33.2 PG
MCV RBC AUTO: 100.9 FL
MONOCYTES # BLD AUTO: 0.7 K/UL
MONOCYTES NFR BLD AUTO: 12.2 %
NEUTROPHILS # BLD AUTO: 3.52 K/UL
NEUTROPHILS NFR BLD AUTO: 61.3 %
PLATELET # BLD AUTO: 213 K/UL
RBC # BLD: 3.52 M/UL
RBC # FLD: 13.7 %
WBC # FLD AUTO: 5.74 K/UL

## 2024-07-29 ENCOUNTER — NON-APPOINTMENT (OUTPATIENT)
Age: 74
End: 2024-07-29

## 2024-08-28 ENCOUNTER — APPOINTMENT (OUTPATIENT)
Dept: CARDIOLOGY | Facility: CLINIC | Age: 74
End: 2024-08-28
Payer: MEDICARE

## 2024-08-28 ENCOUNTER — NON-APPOINTMENT (OUTPATIENT)
Age: 74
End: 2024-08-28

## 2024-08-28 VITALS
BODY MASS INDEX: 23.56 KG/M2 | SYSTOLIC BLOOD PRESSURE: 183 MMHG | WEIGHT: 138 LBS | DIASTOLIC BLOOD PRESSURE: 79 MMHG | OXYGEN SATURATION: 98 % | HEART RATE: 61 BPM | HEIGHT: 64 IN

## 2024-08-28 DIAGNOSIS — I10 ESSENTIAL (PRIMARY) HYPERTENSION: ICD-10-CM

## 2024-08-28 DIAGNOSIS — I48.0 PAROXYSMAL ATRIAL FIBRILLATION: ICD-10-CM

## 2024-08-28 PROCEDURE — 93000 ELECTROCARDIOGRAM COMPLETE: CPT

## 2024-08-28 PROCEDURE — G2211 COMPLEX E/M VISIT ADD ON: CPT

## 2024-08-28 PROCEDURE — 99214 OFFICE O/P EST MOD 30 MIN: CPT

## 2024-08-28 RX ORDER — VALSARTAN 160 MG/1
160 TABLET, COATED ORAL DAILY
Qty: 90 | Refills: 3 | Status: ACTIVE | COMMUNITY
Start: 2024-08-28 | End: 1900-01-01

## 2024-08-28 RX ORDER — DILTIAZEM HYDROCHLORIDE 120 MG/1
120 CAPSULE, EXTENDED RELEASE ORAL DAILY
Qty: 90 | Refills: 3 | Status: ACTIVE | COMMUNITY
Start: 2024-08-28 | End: 1900-01-01

## 2024-08-28 NOTE — CARDIOLOGY SUMMARY
[de-identified] : 12/20/21 sr 1/11/22 nsr July 13, 2022 normal sinus rhythm November 21, 2023 normal sinus rhythm January 9, 2023 normal sinus rhythm 5/20/24 nsr 7/11/24: sinus rhythm  August 28, 2024 sinus rhythm [de-identified] : 1/2022 LVEF 63% NML LV/RV

## 2024-08-28 NOTE — DISCUSSION/SUMMARY
[FreeTextEntry1] : Ms Tatum arrives for follow up post hospitalization . Prior visit history as reported.   I have reviewed all of the medical records available to me at this time from her admission at length, including consultations, laboratory records, radiologic records, medication administration records and discharge summary.  Lilia was found to have new onset symptomatic atrial fibrillation.  She is now back in sinus rhythm, on AV laura agents, and anticoagulated.  Echocardiography was performed July 22, 2024.  This revealed a normal ejection fraction with posterior mitral valve prolapse, and mild to moderate mitral regurgitation.  There was moderate tricuspid regurgitation with an estimated PA systolic pressure of 43 mmHg.  A patent foramen ovale was noted.  A 2-week extended Holter was performed in July, 2024.  This revealed a sinus rhythm with brief SVT, but no clear atrial fibrillation.  Many symptoms were noted without clear correlation to arrhythmias.  She remains in sinus rhythm.  She has been taking short acting diltiazem, and we will change this to diltiazem 120 mg daily.  Her blood pressure is elevated, and does not normalize by the conclusion of the examination.  She will start valsartan 160 mg daily.  She will check her blood pressure daily at home.  She will come for a blood pressure check in the next few weeks, and have blood work done on that day to include a basic metabolic panel.  Have suggested a follow-up with me in about 3 months. [EKG obtained to assist in diagnosis and management of assessed problem(s)] : EKG obtained to assist in diagnosis and management of assessed problem(s)

## 2024-08-28 NOTE — PHYSICAL EXAM
[Well Developed] : well developed [Well Nourished] : well nourished [No Acute Distress] : no acute distress [Normal Conjunctiva] : normal conjunctiva [Normal Venous Pressure] : normal venous pressure [No Carotid Bruit] : no carotid bruit [Normal S1, S2] : normal S1, S2 [No Rub] : no rub [No Gallop] : no gallop [Rhythm Regular] : regular [Normal S1] : normal S1 [Normal S2] : normal S2 [No Murmur] : no murmurs heard [No Pitting Edema] : no pitting edema present [No Abnormalities] : the abdominal aorta was not enlarged and no bruit was heard [Clear Lung Fields] : clear lung fields [Good Air Entry] : good air entry [No Respiratory Distress] : no respiratory distress  [Soft] : abdomen soft [Non Tender] : non-tender [No Masses/organomegaly] : no masses/organomegaly [Normal Bowel Sounds] : normal bowel sounds [Normal Gait] : normal gait [No Edema] : no edema [No Cyanosis] : no cyanosis [No Clubbing] : no clubbing [No Varicosities] : no varicosities [No Rash] : no rash [No Skin Lesions] : no skin lesions [Moves all extremities] : moves all extremities [No Focal Deficits] : no focal deficits [Normal Speech] : normal speech [Alert and Oriented] : alert and oriented [Normal memory] : normal memory [Right Carotid Bruit] : no bruit heard over the right carotid [Left Carotid Bruit] : no bruit heard over the left carotid

## 2024-08-28 NOTE — HISTORY OF PRESENT ILLNESS
[FreeTextEntry1] : Lilia presented to the office today for cardiovascular evaluation.  We saw her 6 weeks ago.  She is now 74 years old, with a history of hypertension.  She has a history of paroxysmal atrial fibrillation.  I saw her in January, 2022, at which time she reported some irregularities in her heart rhythm, and also noticed that she had been anxious.  I felt that this, as well as her blood pressure, were a manifestation of anxiety, and that no additional cardiac testing was needed at that time.   I saw her again in July 2022.  At that time her blood pressure was elevated, but I again felt that this was on the basis of reactive hypertension.  I saw her in January, 2023, and she was feeling well.  Her blood pressure has been reasonable.  Her memory had been deteriorating, and her family was concerned.  She was found to have hyponatremia, and there was planned follow-up with her primary care physician.   She was evaluated in July, 2024 after being seen in the emergency department.  She presented from urgent care with symptomatic new onset atrial fibrillation.  She presented with palpitations at that time.  She was managed with AV laura blocking agents, anticoagulants, and was seen in the office for follow-up.  She was in sinus rhythm at that time.  She was referred for an echocardiogram and an extended Holter.  Echocardiography revealed no new abnormalities, and her extended Holter revealed that she remained in sinus rhythm with only brief SVT.  Atrial fibrillation was not clearly seen.  At baseline, she has remained active.  She has been known to row 10,000m on a regular basis in the past, though recently she has not been exercising as much as she should.  With these and other physical activities, she feels well, without reproducible chest discomfort or shortness of breath suggestive of angina.  She denies orthopnea, PND and edema.  She denies dizziness and syncope. She has had no recurrent palpitations.  She does not check her blood pressure at home.

## 2024-09-25 ENCOUNTER — APPOINTMENT (OUTPATIENT)
Dept: NEUROLOGY | Facility: CLINIC | Age: 74
End: 2024-09-25

## 2024-09-27 ENCOUNTER — NON-APPOINTMENT (OUTPATIENT)
Age: 74
End: 2024-09-27

## 2024-09-27 ENCOUNTER — APPOINTMENT (OUTPATIENT)
Dept: NEUROLOGY | Facility: CLINIC | Age: 74
End: 2024-09-27
Payer: MEDICARE

## 2024-09-27 ENCOUNTER — APPOINTMENT (OUTPATIENT)
Dept: CARDIOLOGY | Facility: CLINIC | Age: 74
End: 2024-09-27

## 2024-09-27 VITALS
BODY MASS INDEX: 22.2 KG/M2 | HEART RATE: 60 BPM | DIASTOLIC BLOOD PRESSURE: 78 MMHG | SYSTOLIC BLOOD PRESSURE: 164 MMHG | HEIGHT: 64 IN | WEIGHT: 130 LBS

## 2024-09-27 DIAGNOSIS — R41.3 OTHER AMNESIA: ICD-10-CM

## 2024-09-27 PROCEDURE — 99204 OFFICE O/P NEW MOD 45 MIN: CPT

## 2024-09-27 NOTE — HISTORY OF PRESENT ILLNESS
[FreeTextEntry1] : Informant: patient, daughter and     NOLAN GARDNER is a 74 year woman who is here for cognitive evaluation. Patient says she is here because her family says she is forgetful.   No language difficulties. Daughter reports over the past year she notices short term memory loss.  She forgets conversations, events, repeats and asks same questions. Often doesn't recognize. Also has Anxiety recently started Zoloft 2024.  She had alot of anxiety planning a trip.  She recently thought she returned licenses plates to [facility].  She always used post it notes and now it is more .  They deny losing things, leaving stove on, confusion, disorientation, wandering or getting lost.   No personality change. She had episode of sodium was low and she was confused described as calling daughter saying she thinks she was drugs because she didn't know what was happening.  It improved when sodium was corrected. Recently luis;led daughter said got home from [facility] at 3am when it was 6:30  As per cardiology note 2024 I saw her again in 2022. At that time her blood pressure was elevated, but I again felt that this was on the basis of reactive hypertension. I saw her in , and she was feeling well. Her blood pressure has been reasonable. Her memory had been deteriorating, and her family was concerned. She was found to have hyponatremia, and there was planned follow-up with her primary care physician. She developed hyponatremia from the chlorothiazide, and so this was discontinued. She was started on amlodipine instead. At baseline, she is active. She has been known to row 10,000 m on a regular basis in the past.   Neuro ROS: -Hx head trauma: denies -Headache: denies -Incontinence: denies -Vertigo/lightheadedness: denies -Seizures: denies -Sensory changes: denies numbness or paresthesia's -Changes in taste/smell: denies -Visual changes: denies -Gait/Balance/falls: denies change -Tremor/abnormal movements: denies -Dysphagia: denies -Syncope/autonomic dysfunction: denies   Neuropsychiatric: -Sleep:  Denies difficulty falling or staying asleep. Denies verbalizations, movements, abnormal dreams movements or snoring, -Appetite: denies weight or appetite changes -Anxiety: + anxiety- started Zoloft -daughter thinks less anxious -Depression/Apathy: denies changes in interests, energy, guilt, or hopelessness -Attention/focusing/concentration: -Suicidal/Homicidal ideations: denies -Hallucinations/illusions: denies   PHQ2 over the last 2 weeks do u have?  (0-none, 1-several days, 2-more than half days, 3-nearly every day) -little interest or pleasure in doing things: 1 -feeling down, depressed, or hopeless: 1   FUNCTIONALITY  QUESTIONS (ACTIVITIES of DAILY LIVING ([place])        Completely independent (2)  Needs some help (1)  Unable, or needs major assistance (0) Bathing/Showerin Dressin Toiletin Transferrin Continence: 2 Feedin Total score (0-12) =12 Lower score = greater impairment   INDEPENDENT ACTIVITIES of DAILY LIVING ([place]) Ability to Use Telephone:2 Shoppin Food Preparation: 2 Housekeepin Laundry:2 Transportation: rarely drives  Responsibility for Own Medications:  daughter sets up pill box -after episode anxiety  Ability to Handle Finances: still manages bills - daughter says few issues with insurance with leak in house  otherwise  Total score (0-16) =   SOCIAL HX -Smoking Hx: quit -Illicit drugs: denies -Alcohol Hx: socially -Marital status:  -Lives with:   -Children: 3  -Occupation: retired RN, 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  No fatigue. Eye: No blurred vision, diplopia, eye pain or visual problems. Head/Ear/Nose/Throat: No hearing loss, tinnitus, sore throat or ear pain. Respiratory: No cough, wheezing or shortness of breath. Cardiovascular: No chest pain, or dyspnea on exertion. Gastrointestinal: No nausea, vomiting, constipation or diarrhea. Genitourinary: No incontinence, urgency or frequency. Integument/breast: No skin lesions. Hematologic/lymphatic: No blood clots, easy bruising/bleeding or anemia. Endocrine: No thyroid disorders or diabetes. Musculoskeletal: No joint pain, back pain, neck pain or muscle pain. Neurological: see HPI Psychiatric: see HPI   GENERAL EXAMINATION General:  Pleasant, well groomed, and in no apparent distress. Skin: Anicteric with no significant lesions noted. Eyes: Anicteric Head/Ears/Mouth/Nose/Throat: NC/AT, conjunctiva clear. Neck: Supple, full ROM. Respiratory: No respiratory distress Cardiovascular: Regular rate and rhythm. Gastrointestinal: Soft, non-tender, non-distended with no masses. Extremities: No edema or calf tenderness, Back: No deformities, no spinal tenderness   NEUROLOGIC EXAMINATION Mental Status: awake, alert, pleasant, oriented x3, speech fluent without word finding pauses Cranial Nerves: VFF PERRL, EOMI without nystagmus, facial sensation intact, face symmetric, hearing intact to fingerrub, palate raises symmetrically, shoulder shrug intact, tongue midline. No dysarthria. Motor: -Tone:  normal -Strength: No pronator drift. Strength is 5/5 in bilateral upper and lower extremities Adventitial movements: No tremors noted. Sensation: grossly intact. Romberg is negative. Reflexes: 1+ at brachioradialis, biceps, triceps, +2 patellar,  bilaterally. Coordination: Intact with finger-to-nose bilaterally. Gait: Steady, with a narrow base. Able to walk on heels and toes. mild difficultyto tandem.  Normal pull test Frontal release signs: No grasp reflex. + palmomental reflex on right . No glabellar reflex.  TESTS -labs  TSH-0.59, cbc WNL -MMSE 2024: 29/30 orientation 9/10 not city, recall 3/3    ASSESSMENT: Patient with progressive cognitive decline over past  year. No clear functional decline.  Discussed diagnostic tests available such as neuropsychological testing, MRI brain, FDG Pet, amyloid scan.   Discussed that none of tests are definitive.   Discussed possible diagnosis, including the natural history and incurable nature of neurodegenerative diseases.   PLAN:  -Advanced Directives: Recommend to discuss advanced directives as family Cognitive symptoms: -not candidate lecanamab: on eliquis -MRI brain w/o contrast -consider FDG PET to differentiate between FTD and Alzheimer's disease -Neuropsychological tests: -lab : can be done at [facility] recommended b12  -May consider cholinesterase inhibitors in future -Encouraged exercise, social activities and healthy diet anxiety - on zoloft- can titrate as needed  Follow-up: after tests daughter [phone]

## 2024-10-04 ENCOUNTER — APPOINTMENT (OUTPATIENT)
Dept: MRI IMAGING | Facility: CLINIC | Age: 74
End: 2024-10-04

## 2024-10-10 ENCOUNTER — RX RENEWAL (OUTPATIENT)
Age: 74
End: 2024-10-10

## 2024-10-10 ENCOUNTER — OUTPATIENT (OUTPATIENT)
Dept: OUTPATIENT SERVICES | Facility: HOSPITAL | Age: 74
LOS: 1 days | End: 2024-10-10
Payer: MEDICARE

## 2024-10-10 ENCOUNTER — APPOINTMENT (OUTPATIENT)
Dept: MRI IMAGING | Facility: CLINIC | Age: 74
End: 2024-10-10

## 2024-10-10 DIAGNOSIS — Z96.642 PRESENCE OF LEFT ARTIFICIAL HIP JOINT: Chronic | ICD-10-CM

## 2024-10-10 DIAGNOSIS — R41.3 OTHER AMNESIA: ICD-10-CM

## 2024-10-10 PROCEDURE — 76377 3D RENDER W/INTRP POSTPROCES: CPT | Mod: 26

## 2024-10-10 PROCEDURE — 70551 MRI BRAIN STEM W/O DYE: CPT | Mod: 26,MH

## 2024-10-16 DIAGNOSIS — R41.9 UNSPECIFIED SYMPTOMS AND SIGNS INVOLVING COGNITIVE FUNCTIONS AND AWARENESS: ICD-10-CM

## 2024-10-16 DIAGNOSIS — R41.89 OTHER SYMPTOMS AND SIGNS INVOLVING COGNITIVE FUNCTIONS AND AWARENESS: ICD-10-CM

## 2024-11-20 PROCEDURE — 70551 MRI BRAIN STEM W/O DYE: CPT

## 2024-11-20 PROCEDURE — 76377 3D RENDER W/INTRP POSTPROCES: CPT

## 2024-11-26 ENCOUNTER — APPOINTMENT (OUTPATIENT)
Dept: CARDIOLOGY | Facility: CLINIC | Age: 74
End: 2024-11-26
Payer: MEDICARE

## 2024-11-26 ENCOUNTER — NON-APPOINTMENT (OUTPATIENT)
Age: 74
End: 2024-11-26

## 2024-11-26 VITALS
DIASTOLIC BLOOD PRESSURE: 70 MMHG | SYSTOLIC BLOOD PRESSURE: 161 MMHG | WEIGHT: 140 LBS | BODY MASS INDEX: 23.9 KG/M2 | HEIGHT: 64 IN | OXYGEN SATURATION: 97 % | HEART RATE: 59 BPM

## 2024-11-26 VITALS — SYSTOLIC BLOOD PRESSURE: 130 MMHG | DIASTOLIC BLOOD PRESSURE: 65 MMHG

## 2024-11-26 DIAGNOSIS — I10 ESSENTIAL (PRIMARY) HYPERTENSION: ICD-10-CM

## 2024-11-26 DIAGNOSIS — I48.0 PAROXYSMAL ATRIAL FIBRILLATION: ICD-10-CM

## 2024-11-26 PROCEDURE — 99214 OFFICE O/P EST MOD 30 MIN: CPT

## 2024-11-26 PROCEDURE — G2211 COMPLEX E/M VISIT ADD ON: CPT

## 2024-11-26 PROCEDURE — 93000 ELECTROCARDIOGRAM COMPLETE: CPT

## 2024-12-10 ENCOUNTER — APPOINTMENT (OUTPATIENT)
Dept: NUCLEAR MEDICINE | Facility: CLINIC | Age: 74
End: 2024-12-10

## 2024-12-10 ENCOUNTER — RESULT REVIEW (OUTPATIENT)
Age: 74
End: 2024-12-10

## 2024-12-10 PROCEDURE — 78999 UNLISTED MISC PX DX NUC MED: CPT

## 2024-12-10 PROCEDURE — 78814 PET IMAGE W/CT LMTD: CPT

## 2024-12-12 ENCOUNTER — APPOINTMENT (OUTPATIENT)
Dept: NEUROLOGY | Facility: CLINIC | Age: 74
End: 2024-12-12
Payer: MEDICARE

## 2024-12-12 VITALS
SYSTOLIC BLOOD PRESSURE: 161 MMHG | HEIGHT: 64 IN | HEART RATE: 62 BPM | WEIGHT: 140 LBS | BODY MASS INDEX: 23.9 KG/M2 | DIASTOLIC BLOOD PRESSURE: 78 MMHG

## 2024-12-12 PROCEDURE — 99213 OFFICE O/P EST LOW 20 MIN: CPT

## 2024-12-12 RX ORDER — DONEPEZIL HYDROCHLORIDE 5 MG/1
5 TABLET ORAL
Qty: 30 | Refills: 3 | Status: ACTIVE | COMMUNITY
Start: 2024-12-12 | End: 1900-01-01

## 2025-01-29 ENCOUNTER — APPOINTMENT (OUTPATIENT)
Dept: NEUROLOGY | Facility: CLINIC | Age: 75
End: 2025-01-29
Payer: MEDICARE

## 2025-01-29 PROCEDURE — 96136 PSYCL/NRPSYC TST PHY/QHP 1ST: CPT

## 2025-01-29 PROCEDURE — 96133 NRPSYC TST EVAL PHYS/QHP EA: CPT

## 2025-01-29 PROCEDURE — 96132 NRPSYC TST EVAL PHYS/QHP 1ST: CPT

## 2025-01-29 PROCEDURE — 96137 PSYCL/NRPSYC TST PHY/QHP EA: CPT

## 2025-01-29 PROCEDURE — 96116 NUBHVL XM PHYS/QHP 1ST HR: CPT

## 2025-02-07 ENCOUNTER — APPOINTMENT (OUTPATIENT)
Dept: NEUROLOGY | Facility: CLINIC | Age: 75
End: 2025-02-07
Payer: MEDICARE

## 2025-02-07 PROCEDURE — 96133 NRPSYC TST EVAL PHYS/QHP EA: CPT | Mod: GT,2W

## 2025-02-08 ENCOUNTER — APPOINTMENT (OUTPATIENT)
Dept: MAMMOGRAPHY | Facility: CLINIC | Age: 75
End: 2025-02-08
Payer: MEDICARE

## 2025-02-08 ENCOUNTER — OUTPATIENT (OUTPATIENT)
Dept: OUTPATIENT SERVICES | Facility: HOSPITAL | Age: 75
LOS: 1 days | End: 2025-02-08
Payer: MEDICARE

## 2025-02-08 ENCOUNTER — APPOINTMENT (OUTPATIENT)
Dept: ULTRASOUND IMAGING | Facility: CLINIC | Age: 75
End: 2025-02-08
Payer: MEDICARE

## 2025-02-08 DIAGNOSIS — Z12.31 ENCOUNTER FOR SCREENING MAMMOGRAM FOR MALIGNANT NEOPLASM OF BREAST: ICD-10-CM

## 2025-02-08 DIAGNOSIS — Z96.642 PRESENCE OF LEFT ARTIFICIAL HIP JOINT: Chronic | ICD-10-CM

## 2025-02-08 PROCEDURE — 77067 SCR MAMMO BI INCL CAD: CPT

## 2025-02-08 PROCEDURE — 77063 BREAST TOMOSYNTHESIS BI: CPT | Mod: 26

## 2025-02-08 PROCEDURE — 77067 SCR MAMMO BI INCL CAD: CPT | Mod: 26

## 2025-02-08 PROCEDURE — 77063 BREAST TOMOSYNTHESIS BI: CPT

## 2025-02-28 ENCOUNTER — APPOINTMENT (OUTPATIENT)
Dept: RADIOLOGY | Facility: CLINIC | Age: 75
End: 2025-02-28

## 2025-02-28 ENCOUNTER — OUTPATIENT (OUTPATIENT)
Dept: OUTPATIENT SERVICES | Facility: HOSPITAL | Age: 75
LOS: 1 days | End: 2025-02-28
Payer: MEDICARE

## 2025-02-28 DIAGNOSIS — Z96.642 PRESENCE OF LEFT ARTIFICIAL HIP JOINT: Chronic | ICD-10-CM

## 2025-02-28 DIAGNOSIS — Z00.8 ENCOUNTER FOR OTHER GENERAL EXAMINATION: ICD-10-CM

## 2025-02-28 DIAGNOSIS — Z13.820 ENCOUNTER FOR SCREENING FOR OSTEOPOROSIS: ICD-10-CM

## 2025-02-28 DIAGNOSIS — M81.6 LOCALIZED OSTEOPOROSIS [LEQUESNE]: ICD-10-CM

## 2025-02-28 PROCEDURE — 77080 DXA BONE DENSITY AXIAL: CPT | Mod: 26

## 2025-02-28 PROCEDURE — 77080 DXA BONE DENSITY AXIAL: CPT

## 2025-03-03 ENCOUNTER — OUTPATIENT (OUTPATIENT)
Dept: OUTPATIENT SERVICES | Facility: HOSPITAL | Age: 75
LOS: 1 days | End: 2025-03-03
Payer: MEDICARE

## 2025-03-03 ENCOUNTER — APPOINTMENT (OUTPATIENT)
Dept: ULTRASOUND IMAGING | Facility: CLINIC | Age: 75
End: 2025-03-03
Payer: MEDICARE

## 2025-03-03 DIAGNOSIS — N60.12 DIFFUSE CYSTIC MASTOPATHY OF LEFT BREAST: ICD-10-CM

## 2025-03-03 DIAGNOSIS — Z00.00 ENCOUNTER FOR GENERAL ADULT MEDICAL EXAMINATION WITHOUT ABNORMAL FINDINGS: ICD-10-CM

## 2025-03-03 DIAGNOSIS — Z96.642 PRESENCE OF LEFT ARTIFICIAL HIP JOINT: Chronic | ICD-10-CM

## 2025-03-03 PROCEDURE — 76641 ULTRASOUND BREAST COMPLETE: CPT

## 2025-03-03 PROCEDURE — 76641 ULTRASOUND BREAST COMPLETE: CPT | Mod: 26,50,GZ

## 2025-03-04 ENCOUNTER — TRANSCRIPTION ENCOUNTER (OUTPATIENT)
Age: 75
End: 2025-03-04

## 2025-03-17 ENCOUNTER — APPOINTMENT (OUTPATIENT)
Dept: ORTHOPEDIC SURGERY | Facility: CLINIC | Age: 75
End: 2025-03-17

## 2025-05-12 ENCOUNTER — RX RENEWAL (OUTPATIENT)
Age: 75
End: 2025-05-12

## 2025-05-27 ENCOUNTER — NON-APPOINTMENT (OUTPATIENT)
Age: 75
End: 2025-05-27

## 2025-05-27 ENCOUNTER — APPOINTMENT (OUTPATIENT)
Dept: CARDIOLOGY | Facility: CLINIC | Age: 75
End: 2025-05-27
Payer: MEDICARE

## 2025-05-27 VITALS
BODY MASS INDEX: 24.92 KG/M2 | WEIGHT: 146 LBS | HEIGHT: 64 IN | HEART RATE: 54 BPM | OXYGEN SATURATION: 97 % | DIASTOLIC BLOOD PRESSURE: 91 MMHG | SYSTOLIC BLOOD PRESSURE: 179 MMHG

## 2025-05-27 DIAGNOSIS — I10 ESSENTIAL (PRIMARY) HYPERTENSION: ICD-10-CM

## 2025-05-27 PROCEDURE — 99214 OFFICE O/P EST MOD 30 MIN: CPT

## 2025-05-27 PROCEDURE — 93000 ELECTROCARDIOGRAM COMPLETE: CPT

## 2025-06-04 ENCOUNTER — RX RENEWAL (OUTPATIENT)
Age: 75
End: 2025-06-04

## 2025-06-04 RX ORDER — DILTIAZEM HYDROCHLORIDE 120 MG/1
120 CAPSULE, EXTENDED RELEASE ORAL
Qty: 90 | Refills: 3 | Status: ACTIVE | COMMUNITY
Start: 2025-06-04 | End: 1900-01-01

## 2025-06-09 ENCOUNTER — EMERGENCY (EMERGENCY)
Facility: HOSPITAL | Age: 75
LOS: 1 days | End: 2025-06-09
Attending: EMERGENCY MEDICINE | Admitting: EMERGENCY MEDICINE
Payer: MEDICARE

## 2025-06-09 VITALS
OXYGEN SATURATION: 98 % | TEMPERATURE: 98 F | HEART RATE: 53 BPM | SYSTOLIC BLOOD PRESSURE: 127 MMHG | DIASTOLIC BLOOD PRESSURE: 68 MMHG | RESPIRATION RATE: 16 BRPM

## 2025-06-09 VITALS
TEMPERATURE: 97 F | HEART RATE: 52 BPM | OXYGEN SATURATION: 96 % | WEIGHT: 119.93 LBS | SYSTOLIC BLOOD PRESSURE: 117 MMHG | RESPIRATION RATE: 16 BRPM | DIASTOLIC BLOOD PRESSURE: 63 MMHG

## 2025-06-09 DIAGNOSIS — Z96.642 PRESENCE OF LEFT ARTIFICIAL HIP JOINT: Chronic | ICD-10-CM

## 2025-06-09 LAB
ALBUMIN SERPL ELPH-MCNC: 3.8 G/DL — SIGNIFICANT CHANGE UP (ref 3.3–5)
ALP SERPL-CCNC: 93 U/L — SIGNIFICANT CHANGE UP (ref 40–120)
ALT FLD-CCNC: 54 U/L — SIGNIFICANT CHANGE UP (ref 12–78)
ANION GAP SERPL CALC-SCNC: 10 MMOL/L — SIGNIFICANT CHANGE UP (ref 5–17)
APTT BLD: 33.6 SEC — SIGNIFICANT CHANGE UP (ref 26.1–36.8)
AST SERPL-CCNC: 80 U/L — HIGH (ref 15–37)
BASOPHILS # BLD AUTO: 0.01 K/UL — SIGNIFICANT CHANGE UP (ref 0–0.2)
BASOPHILS NFR BLD AUTO: 0.2 % — SIGNIFICANT CHANGE UP (ref 0–2)
BILIRUB SERPL-MCNC: 0.8 MG/DL — SIGNIFICANT CHANGE UP (ref 0.2–1.2)
BUN SERPL-MCNC: 18 MG/DL — SIGNIFICANT CHANGE UP (ref 7–23)
CALCIUM SERPL-MCNC: 8.7 MG/DL — SIGNIFICANT CHANGE UP (ref 8.5–10.1)
CHLORIDE SERPL-SCNC: 104 MMOL/L — SIGNIFICANT CHANGE UP (ref 96–108)
CO2 SERPL-SCNC: 22 MMOL/L — SIGNIFICANT CHANGE UP (ref 22–31)
CREAT SERPL-MCNC: 0.68 MG/DL — SIGNIFICANT CHANGE UP (ref 0.5–1.3)
EGFR: 91 ML/MIN/1.73M2 — SIGNIFICANT CHANGE UP
EGFR: 91 ML/MIN/1.73M2 — SIGNIFICANT CHANGE UP
EOSINOPHIL # BLD AUTO: 0.02 K/UL — SIGNIFICANT CHANGE UP (ref 0–0.5)
EOSINOPHIL NFR BLD AUTO: 0.3 % — SIGNIFICANT CHANGE UP (ref 0–6)
GLUCOSE SERPL-MCNC: 77 MG/DL — SIGNIFICANT CHANGE UP (ref 70–99)
HCT VFR BLD CALC: 35 % — SIGNIFICANT CHANGE UP (ref 34.5–45)
HGB BLD-MCNC: 11.8 G/DL — SIGNIFICANT CHANGE UP (ref 11.5–15.5)
IMM GRANULOCYTES NFR BLD AUTO: 0.2 % — SIGNIFICANT CHANGE UP (ref 0–0.9)
INR BLD: 1.39 RATIO — HIGH (ref 0.85–1.16)
LYMPHOCYTES # BLD AUTO: 1.89 K/UL — SIGNIFICANT CHANGE UP (ref 1–3.3)
LYMPHOCYTES # BLD AUTO: 31.8 % — SIGNIFICANT CHANGE UP (ref 13–44)
MAGNESIUM SERPL-MCNC: 2 MG/DL — SIGNIFICANT CHANGE UP (ref 1.6–2.6)
MCHC RBC-ENTMCNC: 33.6 PG — SIGNIFICANT CHANGE UP (ref 27–34)
MCHC RBC-ENTMCNC: 33.7 G/DL — SIGNIFICANT CHANGE UP (ref 32–36)
MCV RBC AUTO: 99.7 FL — SIGNIFICANT CHANGE UP (ref 80–100)
MONOCYTES # BLD AUTO: 0.74 K/UL — SIGNIFICANT CHANGE UP (ref 0–0.9)
MONOCYTES NFR BLD AUTO: 12.5 % — SIGNIFICANT CHANGE UP (ref 2–14)
NEUTROPHILS # BLD AUTO: 3.27 K/UL — SIGNIFICANT CHANGE UP (ref 1.8–7.4)
NEUTROPHILS NFR BLD AUTO: 55 % — SIGNIFICANT CHANGE UP (ref 43–77)
NRBC BLD AUTO-RTO: 0 /100 WBCS — SIGNIFICANT CHANGE UP (ref 0–0)
PLATELET # BLD AUTO: 165 K/UL — SIGNIFICANT CHANGE UP (ref 150–400)
POTASSIUM SERPL-MCNC: 4.2 MMOL/L — SIGNIFICANT CHANGE UP (ref 3.5–5.3)
POTASSIUM SERPL-SCNC: 4.2 MMOL/L — SIGNIFICANT CHANGE UP (ref 3.5–5.3)
PROT SERPL-MCNC: 6.9 G/DL — SIGNIFICANT CHANGE UP (ref 6–8.3)
PROTHROM AB SERPL-ACNC: 16.4 SEC — HIGH (ref 9.9–13.4)
RBC # BLD: 3.51 M/UL — LOW (ref 3.8–5.2)
RBC # FLD: 13 % — SIGNIFICANT CHANGE UP (ref 10.3–14.5)
SODIUM SERPL-SCNC: 136 MMOL/L — SIGNIFICANT CHANGE UP (ref 135–145)
TROPONIN I, HIGH SENSITIVITY RESULT: 6.9 NG/L — SIGNIFICANT CHANGE UP
TROPONIN I, HIGH SENSITIVITY RESULT: 7.2 NG/L — SIGNIFICANT CHANGE UP
WBC # BLD: 5.94 K/UL — SIGNIFICANT CHANGE UP (ref 3.8–10.5)
WBC # FLD AUTO: 5.94 K/UL — SIGNIFICANT CHANGE UP (ref 3.8–10.5)

## 2025-06-09 PROCEDURE — 93005 ELECTROCARDIOGRAM TRACING: CPT

## 2025-06-09 PROCEDURE — 36415 COLL VENOUS BLD VENIPUNCTURE: CPT

## 2025-06-09 PROCEDURE — 85610 PROTHROMBIN TIME: CPT

## 2025-06-09 PROCEDURE — 99285 EMERGENCY DEPT VISIT HI MDM: CPT | Mod: FS

## 2025-06-09 PROCEDURE — 85730 THROMBOPLASTIN TIME PARTIAL: CPT

## 2025-06-09 PROCEDURE — 71045 X-RAY EXAM CHEST 1 VIEW: CPT

## 2025-06-09 PROCEDURE — 93010 ELECTROCARDIOGRAM REPORT: CPT | Mod: 76

## 2025-06-09 PROCEDURE — 85025 COMPLETE CBC W/AUTO DIFF WBC: CPT

## 2025-06-09 PROCEDURE — 80053 COMPREHEN METABOLIC PANEL: CPT

## 2025-06-09 PROCEDURE — 71045 X-RAY EXAM CHEST 1 VIEW: CPT | Mod: 26

## 2025-06-09 PROCEDURE — 99285 EMERGENCY DEPT VISIT HI MDM: CPT | Mod: 25

## 2025-06-09 PROCEDURE — 83735 ASSAY OF MAGNESIUM: CPT

## 2025-06-09 PROCEDURE — 84484 ASSAY OF TROPONIN QUANT: CPT

## 2025-06-09 NOTE — ED PROVIDER NOTE - CARE PROVIDER_API CALL
Kieran Cardenas  Cardiovascular Disease  [address] [place] [address]  Phone: [phone]  Fax: [phone]  Follow Up Time: 1-3 Days

## 2025-06-09 NOTE — ED PROVIDER NOTE - ATTENDING APP SHARED VISIT CONTRIBUTION OF CARE
Patient is a 74-year-old female with a history of A-fib on metoprolol and Eliquis, dementia and depression, HTN, HLD, mild hyponatremia.  Her primary cardiologist is Dr. Cardenas.  Today she was at home expressed to her daughter who is a PA that she was feeling palpitations at about 430 she felt lightheaded.  Daughter went to check on her the patient appeared pale.  Feeling that this may have been an episode of A-fib the symptoms lasted for 20 minutes and resolved.  Patient feels fine now.  She denies any chest pain cough fever chills.  She denies diaphoresis.  She denies dyspnea on exertion.  Patient endorses that she does a stationary rower for exercise 4 times a week without difficulty.    On evaluation is a well-developed with nourished female no apparent distress.  HEENT is unremarkable.  No G/F/R.  Cardiopulmonary examination is normal.  Patient has normal pulses peripherally.  No adventitial sounds in the lungs.  Abdomen soft and nontender without guarding rebound.  Musculoskeletal lamination is normal.  Neurologic examination is normal.  Skin examination is normal.  There is no pallor no bruising or petechiae.    Plan of care includes laboratory testing, electrolyte testing, EKG telemetry monitoring.  For this patient with transient palpitations similar to her prior episode of A-fib.  Patient and family were counseled on use of a watch device or pulse oximeter to measure the heartbeat when she is feeling symptomatic.  Follow-up with the primary cardiologist for Holter monitoring and echocardiographic evaluation.  In agreement with the plan.  Patient is otherwise stable at this time.  This chart was made with dictation software and may contain typographical errors.

## 2025-06-09 NOTE — ED PROVIDER NOTE - PHYSICAL EXAMINATION
Gen: Well appearing in NAD.   Head: atraumatic  Heart: s1/s2, RRR  Lung: CTA b/l, no wheezing/rhonchi or rales.  Abd: soft, NT/ND, NO RLQ TTP no rebound or guarding, NCVAT  Msk: no pedal edema or calf pain, Patient ambulatory in the ED with unremarkable gait  Neuro: AAO x3, patient moving all extremity equally, no focal neuro deficits noted  Skin: Normal for race.  Psych: Calm and cooperative

## 2025-06-09 NOTE — ED ADULT NURSE NOTE - NSFALLLASTSIX_ED_ALL_ED
Relevant Problems   No relevant active problems       Anesthetic History   No history of anesthetic complications            Review of Systems / Medical History  Patient summary reviewed and pertinent labs reviewed    Pulmonary                Comments: + snoring, but denies BIJU   Neuro/Psych              Cardiovascular    Hypertension              Exercise tolerance: >4 METS     GI/Hepatic/Renal     GERD: well controlled           Endo/Other        Obesity and arthritis     Other Findings              Physical Exam    Airway  Mallampati: II  TM Distance: 4 - 6 cm  Neck ROM: normal range of motion   Mouth opening: Normal     Cardiovascular    Rhythm: regular  Rate: normal         Dental  No notable dental hx       Pulmonary  Breath sounds clear to auscultation               Abdominal         Other Findings            Anesthetic Plan    ASA: 2  Anesthesia type: general          Induction: Intravenous  Anesthetic plan and risks discussed with: Patient and Spouse No.

## 2025-06-09 NOTE — ED PROVIDER NOTE - NSFOLLOWUPINSTRUCTIONS_ED_ALL_ED_FT
Follow up with your cardiologist within 2-3 days. Take the copy of your test results you were given in the emergency room for your doctor to review.     Stay hydrated    Return to the ER if your symptoms worsen or for any other medical emergencies  ***********

## 2025-06-09 NOTE — ED PROVIDER NOTE - PROGRESS NOTE DETAILS
SAMUEL Dutton: Labs reviewed–no acute findings.  First Trope negative, second Trope pending.  Second EKG unremarkable.  Patient remained asymptomatic in the ED. SAMUEL Dutton: 2nd trop neg. will dc with outpatient cards f/u

## 2025-06-09 NOTE — ED PROVIDER NOTE - OBJECTIVE STATEMENT
74-year-old female history of A-fib on metoprolol and Eliquis, hypertension, high cholesterol, dementia, depression presents to the ED with complaints of brief episode of palpitation with lightheadedness at 4:30 PM.  Patient reports symptoms lasted approximately 20 minutes then resolved.  When daughter who is a PA checked on patient she noted her mom looked pale.  On arrival to the ED patient reports she feels fine and all of her symptoms have resolved.  Patient's cardiologist is Dr. Cardenas whom she saw 1 week ago.  Patient denies any chest pain, fever, cough, nausea vomiting, paresthesias, headache, extremity weakness, recent travel or prolonged immobilization, sick contacts or all other complaints.  Patient was concerned she possibly had a brief episode of A-fib

## 2025-06-09 NOTE — ED ADULT NURSE NOTE - OBJECTIVE STATEMENT
Pt received aao3, room air, vs as charted, Sinus justice on cardiac monitor. Hx of afib. Pt c/o episode of palpitations and sob. Denies chest pain, numbness, tingling, lightheadedness, or diaphoresis. PIV placed, labs drawn and sent, pt oriented to unit and staff, safety maintained.

## 2025-06-09 NOTE — ED PROVIDER NOTE - PATIENT PORTAL LINK FT
You can access the FollowMyHealth Patient Portal offered by [facility] by registering at the following website: [email or web address]. By joining [facility]’s FollowMyHealth portal, you will also be able to view your health information using other applications (apps) compatible with our system.

## 2025-06-09 NOTE — ED ADULT TRIAGE NOTE - CHIEF COMPLAINT QUOTE
C/O One episode of lightheadedness, dizziness and palpitation this afternoon that lasted about20 minutes

## 2025-07-07 ENCOUNTER — RX RENEWAL (OUTPATIENT)
Age: 75
End: 2025-07-07

## 2025-08-12 ENCOUNTER — APPOINTMENT (OUTPATIENT)
Dept: NEUROLOGY | Facility: CLINIC | Age: 75
End: 2025-08-12

## 2025-08-12 VITALS
BODY MASS INDEX: 23.05 KG/M2 | HEART RATE: 65 BPM | SYSTOLIC BLOOD PRESSURE: 160 MMHG | DIASTOLIC BLOOD PRESSURE: 78 MMHG | HEIGHT: 64 IN | WEIGHT: 135 LBS

## 2025-08-12 DIAGNOSIS — G31.84 MILD COGNITIVE IMPAIRMENT, SO STATED: ICD-10-CM

## 2025-08-12 DIAGNOSIS — F01.50 ALZHEIMER'S DISEASE, UNSPECIFIED: ICD-10-CM

## 2025-08-12 DIAGNOSIS — F02.80 ALZHEIMER'S DISEASE, UNSPECIFIED: ICD-10-CM

## 2025-08-12 DIAGNOSIS — G30.9 ALZHEIMER'S DISEASE, UNSPECIFIED: ICD-10-CM

## 2025-08-12 PROCEDURE — 99214 OFFICE O/P EST MOD 30 MIN: CPT

## 2025-08-12 PROCEDURE — G2211 COMPLEX E/M VISIT ADD ON: CPT
